# Patient Record
Sex: MALE | Race: WHITE | NOT HISPANIC OR LATINO | Employment: FULL TIME | URBAN - METROPOLITAN AREA
[De-identification: names, ages, dates, MRNs, and addresses within clinical notes are randomized per-mention and may not be internally consistent; named-entity substitution may affect disease eponyms.]

---

## 2017-04-14 ENCOUNTER — RADIANT APPOINTMENT (OUTPATIENT)
Dept: GENERAL RADIOLOGY | Facility: CLINIC | Age: 27
End: 2017-04-14
Attending: INTERNAL MEDICINE
Payer: COMMERCIAL

## 2017-04-14 ENCOUNTER — OFFICE VISIT (OUTPATIENT)
Dept: FAMILY MEDICINE | Facility: CLINIC | Age: 27
End: 2017-04-14
Payer: COMMERCIAL

## 2017-04-14 VITALS
SYSTOLIC BLOOD PRESSURE: 124 MMHG | OXYGEN SATURATION: 97 % | HEIGHT: 74 IN | BODY MASS INDEX: 30.67 KG/M2 | TEMPERATURE: 98.2 F | HEART RATE: 62 BPM | RESPIRATION RATE: 16 BRPM | WEIGHT: 239 LBS | DIASTOLIC BLOOD PRESSURE: 70 MMHG

## 2017-04-14 DIAGNOSIS — R10.31 ABDOMINAL PAIN, RIGHT LOWER QUADRANT: Primary | ICD-10-CM

## 2017-04-14 DIAGNOSIS — R10.31 ABDOMINAL PAIN, RIGHT LOWER QUADRANT: ICD-10-CM

## 2017-04-14 PROCEDURE — 74020 XR ABDOMEN 2 VW: CPT

## 2017-04-14 PROCEDURE — 99214 OFFICE O/P EST MOD 30 MIN: CPT | Performed by: INTERNAL MEDICINE

## 2017-04-14 NOTE — NURSING NOTE
"Chief Complaint   Patient presents with     Abdominal Pain     rlq pressure and pain, more near groin. Concern of Hernia       Initial /70  Pulse 62  Temp 98.2  F (36.8  C) (Oral)  Resp 16  Ht 6' 2\" (1.88 m)  Wt 239 lb (108.4 kg)  SpO2 97%  BMI 30.69 kg/m2 Estimated body mass index is 30.69 kg/(m^2) as calculated from the following:    Height as of this encounter: 6' 2\" (1.88 m).    Weight as of this encounter: 239 lb (108.4 kg).  Medication Reconciliation: complete  "

## 2017-04-14 NOTE — MR AVS SNAPSHOT
"              After Visit Summary   4/14/2017    Travis Carbone    MRN: [id number]           Patient Information     Date Of Birth          1990        Visit Information        Provider Department      4/14/2017 3:00 PM Leah Kramer MD [facility]        Today's Diagnoses     Abdominal pain, right lower quadrant    -  1      Care Instructions    No abdominal wall defect or inguinal hernia found today  Question if related to gas/bowel movements.  Try miralax 1 dose a day in water to see if improved     If symptoms persist, could see Urology for their opinion  Or recheck here in clinic    Call or return to clinic if symptoms worsen or fail to improve as anticipated.          Follow-ups after your visit        Who to contact     If you have questions or need follow up information about today's clinic visit or your schedule please contact [facility] directly at [phone].  Normal or non-critical lab and imaging results will be communicated to you by MyChart, letter or phone within 4 business days after the clinic has received the results. If you do not hear from us within 7 days, please contact the clinic through [employer] or phone. If you have a critical or abnormal lab result, we will notify you by phone as soon as possible.  Submit refill requests through [employer] or call your pharmacy and they will forward the refill request to us. Please allow 3 business days for your refill to be completed.          Additional Information About Your Visit        MyChart Information     [employer] lets you send messages to your doctor, view your test results, renew your prescriptions, schedule appointments and more. To sign up, go to [email or web address] . Click on \"Log in\" on the left side of the screen, which will take you to the Welcome page. Then click on \"Sign up Now\" on the right side of the page.     You will be asked to enter the access code listed below, as well as " "some personal information. Please follow the directions to create your username and password.     Your access code is: [email or web address]  Expires: 2017  3:56 PM     Your access code will  in 90 days. If you need help or a new code, please call your [facility] or [phone].        Care EveryWhere ID     This is your Care EveryWhere ID. This could be used by other organizations to access your [place] medical records  [id number]        Your Vitals Were     Pulse Temperature Respirations Height Pulse Oximetry BMI (Body Mass Index)    62 98.2  F (36.8  C) (Oral) 16 6' 2\" (1.88 m) 97% 30.69 kg/m2       Blood Pressure from Last 3 Encounters:   17 124/70    Weight from Last 3 Encounters:   17 239 lb (108.4 kg)               Primary Care Provider    None Specified       No primary provider on file.        Thank you!     Thank you for choosing [facility]  for your care. Our goal is always to provide you with excellent care. Hearing back from our patients is one way we can continue to improve our services. Please take a few minutes to complete the written survey that you may receive in the mail after your visit with us. Thank you!             Your Updated Medication List - Protect others around you: Learn how to safely use, store and throw away your medicines at www.disposemymeds.org.      Notice  As of 2017  3:56 PM    You have not been prescribed any medications.      "

## 2017-04-14 NOTE — PATIENT INSTRUCTIONS
No abdominal wall defect or inguinal hernia found today  Question if related to gas/bowel movements.  Try miralax 1 dose a day in water to see if improved     If symptoms persist, could see Urology for their opinion  Or recheck here in clinic    Call or return to clinic if symptoms worsen or fail to improve as anticipated.

## 2017-04-14 NOTE — PROGRESS NOTES
"SUBJECTIVE:  Travis OKSANA Carbone, a 26 year old male scheduled an appointment to discuss the following issues:  Chief Complaint   Patient presents with     Abdominal Pain     rlq pressure and pain, more near groin. Concern of Hernia     Pressure in right groin area, may shoot down near right testicle  intermittently past few months.  No lump of bump noticed  Area may be firmness or gas. As he & wife have changed diet/eating healthier.   Wonders if gas, felt better bowel movements  More noticeable past few days    4 mile run last week bothered him.    No injury recalled  In [employer]    There is no problem list on file for this patient.        No current outpatient prescriptions on file prior to visit.  No current facility-administered medications on file prior to visit.       Medical, social, surgical, and family histories reviewed and updated as of 4/14/2017.    ROS:  C: NEGATIVE for fever, chills  GI: NEGATIVE for nausea, vomiting  bowel movement are normal   normal appetite  No testicle pain      OBJECTIVE:  /70  Pulse 62  Temp 98.2  F (36.8  C) (Oral)  Resp 16  Ht 6' 2\" (1.88 m)  Wt 239 lb (108.4 kg)  SpO2 97%  BMI 30.69 kg/m2  EXAM:  GENERAL APPEARANCE: healthy, alert and no distress  ABDOMEN:  soft, ? Vague mild tenderness RLQ without guarding or rebound, no masses or abd wall defects  and bowel sounds normal    GU_male: testicles normal without atrophy or masses, no hernias  Noted in inguinal or direct hernia area    AXR to eval if firmness/discomfort related to gas/stool  Xray there is stool in area of discomfort      ASSESSMENT/PLAN:    ASSESSMENT/PLAN:      ICD-10-CM    1. Abdominal pain, right lower quadrant R10.31 XR Abdomen 2 Views       Patient Instructions   No abdominal wall defect or inguinal hernia found today  Question if related to gas/bowel movements.  Try miralax 1 dose a day in water to see if improved     If symptoms persist, could see Urology for their opinion  Or recheck here in " clinic    Call or return to clinic if symptoms worsen or fail to improve as anticipated.          Leah Kramer MD

## 2017-09-07 ENCOUNTER — OFFICE VISIT (OUTPATIENT)
Dept: FAMILY MEDICINE | Facility: CLINIC | Age: 27
End: 2017-09-07
Payer: COMMERCIAL

## 2017-09-07 VITALS
TEMPERATURE: 97.5 F | OXYGEN SATURATION: 97 % | WEIGHT: 255 LBS | DIASTOLIC BLOOD PRESSURE: 78 MMHG | SYSTOLIC BLOOD PRESSURE: 122 MMHG | BODY MASS INDEX: 32.74 KG/M2 | HEART RATE: 67 BPM

## 2017-09-07 DIAGNOSIS — Z00.00 ROUTINE GENERAL MEDICAL EXAMINATION AT A HEALTH CARE FACILITY: Primary | ICD-10-CM

## 2017-09-07 LAB
ANION GAP SERPL CALCULATED.3IONS-SCNC: 9 MMOL/L (ref 3–14)
BUN SERPL-MCNC: 19 MG/DL (ref 7–30)
CALCIUM SERPL-MCNC: 9.5 MG/DL (ref 8.5–10.1)
CHLORIDE SERPL-SCNC: 101 MMOL/L (ref 94–109)
CHOLEST SERPL-MCNC: 176 MG/DL
CO2 SERPL-SCNC: 28 MMOL/L (ref 20–32)
CREAT SERPL-MCNC: 0.93 MG/DL (ref 0.66–1.25)
GFR SERPL CREATININE-BSD FRML MDRD: >90 ML/MIN/1.7M2
GLUCOSE SERPL-MCNC: 95 MG/DL (ref 70–99)
HDLC SERPL-MCNC: 42 MG/DL
LDLC SERPL CALC-MCNC: 87 MG/DL
NONHDLC SERPL-MCNC: 134 MG/DL
POTASSIUM SERPL-SCNC: 4 MMOL/L (ref 3.4–5.3)
SODIUM SERPL-SCNC: 138 MMOL/L (ref 133–144)
TRIGL SERPL-MCNC: 236 MG/DL

## 2017-09-07 PROCEDURE — 99385 PREV VISIT NEW AGE 18-39: CPT | Performed by: NURSE PRACTITIONER

## 2017-09-07 PROCEDURE — 80048 BASIC METABOLIC PNL TOTAL CA: CPT | Performed by: NURSE PRACTITIONER

## 2017-09-07 PROCEDURE — 36415 COLL VENOUS BLD VENIPUNCTURE: CPT | Performed by: NURSE PRACTITIONER

## 2017-09-07 PROCEDURE — 80061 LIPID PANEL: CPT | Performed by: NURSE PRACTITIONER

## 2017-09-07 ASSESSMENT — PAIN SCALES - GENERAL: PAINLEVEL: NO PAIN (0)

## 2017-09-07 NOTE — LETTER
[facility]  [address] [place]  [address]  [phone]        September 11, 2017    Travis Carbone  [address] [place] [place] [address]        Dear Travis,    The results of your recent labs are enclosed.  Your cholesterol is elevated, but not high enough that you need to be treated with a medication. Reduce your consumption of cholesterol and saturated fats and eliminate trans fats from your diet. Increase your consumption of healthy fats (nuts, fish, seafood, avocado, olive oil), whole grains and fruits and vegetables. I also recommend exercising 150 minutes a week. These changes will help to improve your cholesterol. I recommend rechecking your cholesterol in 6 months to make sure it's improving.   Your other labs look good.   Please call the clinic if you have any concerns.    Results for orders placed or performed in visit on 09/07/17   Basic metabolic panel   Result Value Ref Range    Sodium 138 133 - 144 mmol/L    Potassium 4.0 3.4 - 5.3 mmol/L    Chloride 101 94 - 109 mmol/L    Carbon Dioxide 28 20 - 32 mmol/L    Anion Gap 9 3 - 14 mmol/L    Glucose 95 70 - 99 mg/dL    Urea Nitrogen 19 7 - 30 mg/dL    Creatinine 0.93 0.66 - 1.25 mg/dL    GFR Estimate >90 >60 mL/min/1.7m2    GFR Estimate If Black >90 >60 mL/min/1.7m2    Calcium 9.5 8.5 - 10.1 mg/dL   Lipid Profile (Chol, Trig, HDL, LDL calc)   Result Value Ref Range    Cholesterol 176 <200 mg/dL    Triglycerides 236 (H) <150 mg/dL    HDL Cholesterol 42 >39 mg/dL    LDL Cholesterol Calculated 87 <100 mg/dL    Non HDL Cholesterol 134 (H) <130 mg/dL   If you have any questions please call the clinic at [phone].  Sincerely,    Moriah Alfredo APRN CNP  LMD

## 2017-09-07 NOTE — PROGRESS NOTES
SUBJECTIVE:   CC: Travis Carbone is an 27 year old male who presents for preventative health visit.     Physical   Annual:     Getting at least 3 servings of Calcium per day::  Yes    Bi-annual eye exam::  Yes    Dental care twice a year::  Yes    Sleep apnea or symptoms of sleep apnea::  None    Diet::  Regular (no restrictions)    Frequency of exercise::  4-5 days/week    Duration of exercise::  30-45 minutes    Taking medications regularly::  Yes    Medication side effects::  None    Additional concerns today::  No      Patient would like to be a   Needs letter stating he's ok to participate in physical exam  Exercises regularly. Tolerates without chest pain, palpitations, excessive SOB  Nonsmoker  No asthma  No history of syncope  No previous medical history  Does not drink alcohol  No drug use  No prior history of fracture  No family history of early onset heart disease or sudden death    He denies other concerns    Today's PHQ-2 Score: PHQ-2 ( 1999 [employer]) 9/7/2017   Q1: Little interest or pleasure in doing things 0   Q2: Feeling down, depressed or hopeless 0   PHQ-2 Score 0   Q1: Little interest or pleasure in doing things Not at all   Q2: Feeling down, depressed or hopeless Not at all   PHQ-2 Score 0       Abuse: Current or Past(Physical, Sexual or Emotional)- No  Do you feel safe in your environment - Yes    Social History   Substance Use Topics     Smoking status: Never Smoker     Smokeless tobacco: Never Used     Alcohol use No     The patient does not drink >3 drinks per day nor >7 drinks per week.    Last PSA: No results found for: PSA    Reviewed orders with patient. Reviewed health maintenance and updated orders accordingly - Yes  No previous records available    Reviewed and updated as needed this visit by clinical staff  Tobacco  Allergies  Meds  Med Hx  Surg Hx  Fam Hx  Soc Hx        Reviewed and updated as needed this visit by Provider              ROS:  C: NEGATIVE  for fever, chills, change in weight  I: NEGATIVE for worrisome rashes, moles or lesions  E: NEGATIVE for vision changes or irritation  ENT: NEGATIVE for ear, mouth and throat problems  R: NEGATIVE for significant cough or SOB  CV: NEGATIVE for chest pain, palpitations or peripheral edema  GI: NEGATIVE for nausea, abdominal pain, heartburn, or change in bowel habits   male: negative for dysuria, hematuria, decreased urinary stream, erectile dysfunction, urethral discharge  M: NEGATIVE for significant arthralgias or myalgia  N: NEGATIVE for weakness, dizziness or paresthesias  P: NEGATIVE for changes in mood or affect    OBJECTIVE:   /78 (BP Location: Right arm, Patient Position: Chair, Cuff Size: Adult Large)  Pulse 67  Temp 97.5  F (36.4  C) (Oral)  Wt 255 lb (115.7 kg)  SpO2 97%  BMI 32.74 kg/m2    EXAM:  GENERAL: healthy, alert and no distress  EYES: Eyes grossly normal to inspection, PERRL and conjunctivae and sclerae normal  HENT: ear canals and TM's normal, nose and mouth without ulcers or lesions  NECK: no adenopathy, no asymmetry, masses, or scars and thyroid normal to palpation  RESP: lungs clear to auscultation - no rales, rhonchi or wheezes  CV: regular rate and rhythm, normal S1 S2, no S3 or S4, no murmur, click or rub, no peripheral edema and peripheral pulses strong  ABDOMEN: soft, nontender, no hepatosplenomegaly, no masses and bowel sounds normal  MS: no gross musculoskeletal defects noted, no edema  SKIN: no suspicious lesions or rashes  NEURO: Normal strength and tone, mentation intact and speech normal  PSYCH: mentation appears normal, affect normal/bright    ASSESSMENT/PLAN:       ICD-10-CM    1. Routine general medical examination at a health care facility Z00.00 Basic metabolic panel     Lipid Profile (Chol, Trig, HDL, LDL calc)       COUNSELING:   Reviewed preventive health counseling, as reflected in patient instructions       Regular exercise       Healthy  "diet/nutrition    Letter written       reports that he has never smoked. He has never used smokeless tobacco.      Estimated body mass index is 32.74 kg/(m^2) as calculated from the following:    Height as of 4/14/17: 6' 2\" (1.88 m).    Weight as of this encounter: 255 lb (115.7 kg).         Counseling Resources:  ATP IV Guidelines  Pooled Cohorts Equation Calculator  FRAX Risk Assessment  ICSI Preventive Guidelines  Dietary Guidelines for Americans, 2010  USDA's MyPlate  ASA Prophylaxis  Lung CA Screening    ROBIN Hines CNP  [facility] for HPI/ROS submitted by the patient on 9/7/2017   PHQ-2 Score: 0    "

## 2017-09-07 NOTE — LETTER
[address] [address]  Phone: [phone]  Fax: [phone]    September 7, 2017        Travis Carbone  [address] [place] [address]          To whom it may concern:    RE: Travis Carbone    Patient was seen for a physical exam at clinic today.  He is ok to participate in physical fitness exam and work as a .    Please contact me for questions or concerns.      Sincerely,        ROBIN Hines CNP

## 2017-09-07 NOTE — MR AVS SNAPSHOT
"              After Visit Summary   2017    Travis Carbone    MRN: [id number]           Patient Information     Date Of Birth          1990        Visit Information        Provider Department      2017 8:40 AM Moriah Alfredo APRN CNP [facility]        Today's Diagnoses     Routine general medical examination at a health care facility    -  1       Follow-ups after your visit        Who to contact     If you have questions or need follow up information about today's clinic visit or your schedule please contact [facility] directly at [phone].  Normal or non-critical lab and imaging results will be communicated to you by [employer], letter or phone within 4 business days after the clinic has received the results. If you do not hear from us within 7 days, please contact the clinic through [employer] or phone. If you have a critical or abnormal lab result, we will notify you by phone as soon as possible.  Submit refill requests through [employer] or call your pharmacy and they will forward the refill request to us. Please allow 3 business days for your refill to be completed.          Additional Information About Your Visit        MyChart Information     [employer] lets you send messages to your doctor, view your test results, renew your prescriptions, schedule appointments and more. To sign up, go to [email or web address] . Click on \"Log in\" on the left side of the screen, which will take you to the Welcome page. Then click on \"Sign up Now\" on the right side of the page.     You will be asked to enter the access code listed below, as well as some personal information. Please follow the directions to create your username and password.     Your access code is: [email or web address]  Expires: 2017  9:12 AM     Your access code will  in 90 days. If you need help or a new code, please call your [facility] or [phone].        Care EveryWhere ID     " This is your Care EveryWhere ID. This could be used by other organizations to access your [place] medical records  [id number]        Your Vitals Were     Pulse Temperature Pulse Oximetry BMI (Body Mass Index)          67 97.5  F (36.4  C) (Oral) 97% 32.74 kg/m2         Blood Pressure from Last 3 Encounters:   09/07/17 122/78   04/14/17 124/70    Weight from Last 3 Encounters:   09/07/17 255 lb (115.7 kg)   04/14/17 239 lb (108.4 kg)              We Performed the Following     Basic metabolic panel     Lipid Profile (Chol, Trig, HDL, LDL calc)        Primary Care Provider    None Specified       No primary provider on file.        Equal Access to Services     DAE BAXTER : David Fang, ruthie vargas, tianna wasserman, eric ramos . So [facility] [phone].    ATENCIÓN: Si habla español, tiene a sullivan disposición servicios gratuitos de asistencia lingüística. Llame al [phone].    We comply with applicable federal civil rights laws and [place] laws. We do not discriminate on the basis of race, color, national origin, age, disability sex, sexual orientation or gender identity.            Thank you!     Thank you for choosing [facility]  for your care. Our goal is always to provide you with excellent care. Hearing back from our patients is one way we can continue to improve our services. Please take a few minutes to complete the written survey that you may receive in the mail after your visit with us. Thank you!             Your Updated Medication List - Protect others around you: Learn how to safely use, store and throw away your medicines at www.disposemymeds.org.      Notice  As of 9/7/2017  9:12 AM    You have not been prescribed any medications.

## 2017-09-11 DIAGNOSIS — E78.5 HYPERLIPIDEMIA LDL GOAL <130: Primary | ICD-10-CM

## 2017-09-11 NOTE — PROGRESS NOTES
[address] [address]  Phone: [phone]  Fax: [phone]      09/11/17    Travis Carbone  [address] [place] [address]      Dear Travis,    The results of your recent labs are enclosed.  Your cholesterol is elevated, but not high enough that you need to be treated with a medication. Reduce your consumption of cholesterol and saturated fats and eliminate trans fats from your diet. Increase your consumption of healthy fats (nuts, fish, seafood, avocado, olive oil), whole grains and fruits and vegetables. I also recommend exercising 150 minutes a week. These changes will help to improve your cholesterol. I recommend rechecking your cholesterol in 6 months to make sure it's improving.   Your other labs look good.       Please call the clinic if you have any concerns.    Sincerely,    ROBIN Hines CNP    Your [facility] Care Team

## 2017-12-16 ENCOUNTER — OFFICE VISIT (OUTPATIENT)
Dept: URGENT CARE | Facility: URGENT CARE | Age: 27
End: 2017-12-16
Payer: COMMERCIAL

## 2017-12-16 VITALS
OXYGEN SATURATION: 97 % | HEART RATE: 77 BPM | DIASTOLIC BLOOD PRESSURE: 80 MMHG | BODY MASS INDEX: 33.18 KG/M2 | WEIGHT: 258.4 LBS | TEMPERATURE: 98.9 F | SYSTOLIC BLOOD PRESSURE: 154 MMHG | RESPIRATION RATE: 16 BRPM

## 2017-12-16 DIAGNOSIS — B34.9 VIRAL SYNDROME: Primary | ICD-10-CM

## 2017-12-16 DIAGNOSIS — R07.0 THROAT PAIN: ICD-10-CM

## 2017-12-16 DIAGNOSIS — R09.82 POST-NASAL DRIP: ICD-10-CM

## 2017-12-16 LAB
DEPRECATED S PYO AG THROAT QL EIA: NORMAL
SPECIMEN SOURCE: NORMAL

## 2017-12-16 PROCEDURE — 87880 STREP A ASSAY W/OPTIC: CPT | Performed by: NURSE PRACTITIONER

## 2017-12-16 PROCEDURE — 99213 OFFICE O/P EST LOW 20 MIN: CPT | Performed by: NURSE PRACTITIONER

## 2017-12-16 PROCEDURE — 87081 CULTURE SCREEN ONLY: CPT | Performed by: NURSE PRACTITIONER

## 2017-12-16 NOTE — MR AVS SNAPSHOT
"              After Visit Summary   12/16/2017    Travis Carbone    MRN: [id number]           Patient Information     Date Of Birth          1990        Visit Information        Provider Department      12/16/2017 10:45 AM Phylicia Nair APRN [facility]        Today's Diagnoses     Viral syndrome    -  1    Throat pain        Post-nasal drip          Care Instructions    Increase rest and fluids. Tylenol and/or Ibuprofen for comfort. Cool mist vaporizer. If your symptoms worsen or do not resolve follow up with your primary care provider in 1 week and sooner if needed.      Strep culture is pending will result in 24-48 hours.  If it is positive and change in treatment plan will contact you.    Mucinex 600 mg 12 hour formula for ear, head and chest congestion.  It can also thin post nasal drip which can cause a cough and sore throat.      Indications for emergent return to emergency department discussed with patient, who verbalized good understanding and agreement.  Patient understands the limitations of today's evaluation.           Viral Syndrome (Adult)  A viral illness may cause a number of symptoms. The symptoms depend on the part of the body that the virus affects. If it settles in your nose, throat, and lungs, it may cause cough, sore throat, congestion, and sometimes headache. If it settles in your stomach and intestinal tract, it may cause vomiting and diarrhea. Sometimes it causes vague symptoms like \"aching all over,\" feeling tired, loss of appetite, or fever.  A viral illness usually lasts 1 to 2 weeks, but sometimes it lasts longer. In some cases, a more serious infection can look like a viral syndrome in the first few days of the illness. You may need another exam and additional tests to know the difference. Watch for the warning signs listed below.  Home care  Follow these guidelines for taking care of yourself at home:    If symptoms are severe, rest " at home for the first 2 to 3 days.    Stay away from cigarette smoke - both your smoke and the smoke from others.    You may use over-the-counter acetaminophen or ibuprofen for fever, muscle aching, and headache, unless another medicine was prescribed for this. If you have chronic liver or kidney disease or ever had a stomach ulcer or GI bleeding, talk with your doctor before using these medicines. No one who is younger than 18 and ill with a fever should take aspirin. It may cause severe disease or death.    Your appetite may be poor, so a light diet is fine. Avoid dehydration by drinking 8 to 12 8-ounce glasses of fluids each day. This may include water; orange juice; lemonade; apple, grape, and cranberry juice; clear fruit drinks; electrolyte replacement and sports drinks; and decaffeinated teas and coffee. If you have been diagnosed with a kidney disease, ask your doctor how much and what types of fluids you should drink to prevent dehydration. If you have kidney disease, drinking too much fluid can cause it build up in the your body and be dangerous to your health.    Over-the-counter remedies won't shorten the length of the illness but may be helpful for cough, sore throat; and nasal and sinus congestion. Don't use decongestants if you have high blood pressure.  Follow-up care  Follow up with your healthcare provider if you do not improve over the next week.  Call 911  Get emergency medical care if any of the following occur:    Convulsion    Feeling weak, dizzy, or like you are going to faint    Chest pain, shortness of breath, wheezing, or difficulty breathing  When to seek medical advice  Call your healthcare provider right away if any of these occur:    Cough with lots of colored sputum (mucus) or blood in your sputum    Chest pain, shortness of breath, wheezing, or difficulty breathing    Severe headache; face, neck, or ear pain    Severe, constant pain in the lower right side of your belly  (abdominal)    Continued vomiting (can t keep liquids down)    Frequent diarrhea (more than 5 times a day); blood (red or black color) or mucus in diarrhea    Feeling weak, dizzy, or like you are going to faint    Extreme thirst    Fever of 100.4 F (38 C) or higher, or as directed by your healthcare provider  Date Last Reviewed: 9/25/2015 2000-2017 [employer]. [address] [place] [address], [place] [address]. All rights reserved. This information is not intended as a substitute for professional medical care. Always follow your healthcare professional's instructions.        Self-Care for Sore Throats    Sore throats happen for many reasons, such as colds, allergies, and infections caused by viruses or bacteria. In any case, your throat becomes red and sore. Your goal for self-care is to reduce your discomfort while giving your throat a chance to heal.  Moisten and soothe your throat  Tips include the following:    Try a sip of water first thing after waking up.    Keep your throat moist by drinking 6 or more glasses of clear liquids every day.    Run a cool-air humidifier in your room overnight.    Avoid cigarette smoke.     Suck on throat lozenges, cough drops, hard candy, ice chips, or frozen fruit-juice bars. Use the sugar-free versions if your diet or medical condition requires them.  Gargle to ease irritation  Gargling every hour or 2 can ease irritation. Try gargling with 1 of these solutions:    1/4 teaspoon of salt in 1/2 cup of warm water    An over-the-counter anesthetic gargle  Use medicine for more relief  Over-the-counter medicine can reduce sore throat symptoms. Ask your pharmacist if you have questions about which medicine to use:    Ease pain with anesthetic sprays. Aspirin or an aspirin substitute also helps. Remember, never give aspirin to anyone 18 or younger, or if you are already taking blood thinners.     For sore throats caused by allergies, try antihistamines to block the allergic  reaction.    Remember: unless a sore throat is caused by a bacterial infection, antibiotics won t help you.  Prevent future sore throats  Prevention tips include the following:    Stop smoking or reduce contact with secondhand smoke. Smoke irritates the tender throat lining.    Limit contact with pets and with allergy-causing substances, such as pollen and mold.    When you re around someone with a sore throat or cold, wash your hands often to keep viruses or bacteria from spreading.    Don t strain your vocal cords.  Call your healthcare provider  Contact your healthcare provider if you have:    A temperature over 101 F (38.3 C)    White spots on the throat    Great difficulty swallowing    Trouble breathing    A skin rash    Recent exposure to someone else with strep bacteria    Severe hoarseness and swollen glands in the neck or jaw   Date Last Reviewed: 8/1/2016 2000-2017 [employer]. [address] [place] [address]. All rights reserved. This information is not intended as a substitute for professional medical care. Always follow your healthcare professional's instructions.                Follow-ups after your visit        Follow-up notes from your care team     See patient instructions section of the AVS Return if symptoms worsen or fail to improve, for Follow up with your primary care provider.      Who to contact     If you have questions or need follow up information about today's clinic visit or your schedule please contact [facility] directly at [phone].  Normal or non-critical lab and imaging results will be communicated to you by MyChart, letter or phone within 4 business days after the clinic has received the results. If you do not hear from us within 7 days, please contact the clinic through MyChart or phone. If you have a critical or abnormal lab result, we will notify you by phone as soon as possible.  Submit refill requests through  "MyChart or call your pharmacy and they will forward the refill request to us. Please allow 3 business days for your refill to be completed.          Additional Information About Your Visit        MyChart Information     [employer] lets you send messages to your doctor, view your test results, renew your prescriptions, schedule appointments and more. To sign up, go to [email or web address] . Click on \"Log in\" on the left side of the screen, which will take you to the Welcome page. Then click on \"Sign up Now\" on the right side of the page.     You will be asked to enter the access code listed below, as well as some personal information. Please follow the directions to create your username and password.     Your access code is: [email or web address]  Expires: 3/16/2018 11:27 AM     Your access code will  in 90 days. If you need help or a new code, please call your [facility] clinic or [phone].        Care EveryWhere ID     This is your Care EveryWhere ID. This could be used by other organizations to access your [facility] medical records  [id number]        Your Vitals Were     Pulse Temperature Respirations Pulse Oximetry BMI (Body Mass Index)       77 98.9  F (37.2  C) (Tympanic) 16 97% 33.18 kg/m2        Blood Pressure from Last 3 Encounters:   17 154/80   17 122/78   17 124/70    Weight from Last 3 Encounters:   17 258 lb 6.4 oz (117.2 kg)   17 255 lb (115.7 kg)   17 239 lb (108.4 kg)              We Performed the Following     Beta strep group A culture     Strep, Rapid Screen        Primary Care Provider Fax #    Physician No Ref-Primary [phone]       No address on file        Equal Access to Services     DAE BAXTER : David Fang, ruthie vargas, tianna wasserman, eric keenan. So [facility] [phone].    ATENCIÓN: Si habla español, tiene a sullivan disposición servicios gratuitos de asistencia lingüística. Llame al " [phone].    We comply with applicable federal civil rights laws and [place] laws. We do not discriminate on the basis of race, color, national origin, age, disability, sex, sexual orientation, or gender identity.            Thank you!     Thank you for choosing [facility]  for your care. Our goal is always to provide you with excellent care. Hearing back from our patients is one way we can continue to improve our services. Please take a few minutes to complete the written survey that you may receive in the mail after your visit with us. Thank you!             Your Updated Medication List - Protect others around you: Learn how to safely use, store and throw away your medicines at www.disposemymeds.org.      Notice  As of 12/16/2017 11:27 AM    You have not been prescribed any medications.

## 2017-12-16 NOTE — PATIENT INSTRUCTIONS
"Increase rest and fluids. Tylenol and/or Ibuprofen for comfort. Cool mist vaporizer. If your symptoms worsen or do not resolve follow up with your primary care provider in 1 week and sooner if needed.      Strep culture is pending will result in 24-48 hours.  If it is positive and change in treatment plan will contact you.    Mucinex 600 mg 12 hour formula for ear, head and chest congestion.  It can also thin post nasal drip which can cause a cough and sore throat.      Indications for emergent return to emergency department discussed with patient, who verbalized good understanding and agreement.  Patient understands the limitations of today's evaluation.           Viral Syndrome (Adult)  A viral illness may cause a number of symptoms. The symptoms depend on the part of the body that the virus affects. If it settles in your nose, throat, and lungs, it may cause cough, sore throat, congestion, and sometimes headache. If it settles in your stomach and intestinal tract, it may cause vomiting and diarrhea. Sometimes it causes vague symptoms like \"aching all over,\" feeling tired, loss of appetite, or fever.  A viral illness usually lasts 1 to 2 weeks, but sometimes it lasts longer. In some cases, a more serious infection can look like a viral syndrome in the first few days of the illness. You may need another exam and additional tests to know the difference. Watch for the warning signs listed below.  Home care  Follow these guidelines for taking care of yourself at home:    If symptoms are severe, rest at home for the first 2 to 3 days.    Stay away from cigarette smoke - both your smoke and the smoke from others.    You may use over-the-counter acetaminophen or ibuprofen for fever, muscle aching, and headache, unless another medicine was prescribed for this. If you have chronic liver or kidney disease or ever had a stomach ulcer or GI bleeding, talk with your doctor before using these medicines. No one who is younger " than 18 and ill with a fever should take aspirin. It may cause severe disease or death.    Your appetite may be poor, so a light diet is fine. Avoid dehydration by drinking 8 to 12 8-ounce glasses of fluids each day. This may include water; orange juice; lemonade; apple, grape, and cranberry juice; clear fruit drinks; electrolyte replacement and sports drinks; and decaffeinated teas and coffee. If you have been diagnosed with a kidney disease, ask your doctor how much and what types of fluids you should drink to prevent dehydration. If you have kidney disease, drinking too much fluid can cause it build up in the your body and be dangerous to your health.    Over-the-counter remedies won't shorten the length of the illness but may be helpful for cough, sore throat; and nasal and sinus congestion. Don't use decongestants if you have high blood pressure.  Follow-up care  Follow up with your healthcare provider if you do not improve over the next week.  Call 911  Get emergency medical care if any of the following occur:    Convulsion    Feeling weak, dizzy, or like you are going to faint    Chest pain, shortness of breath, wheezing, or difficulty breathing  When to seek medical advice  Call your healthcare provider right away if any of these occur:    Cough with lots of colored sputum (mucus) or blood in your sputum    Chest pain, shortness of breath, wheezing, or difficulty breathing    Severe headache; face, neck, or ear pain    Severe, constant pain in the lower right side of your belly (abdominal)    Continued vomiting (can t keep liquids down)    Frequent diarrhea (more than 5 times a day); blood (red or black color) or mucus in diarrhea    Feeling weak, dizzy, or like you are going to faint    Extreme thirst    Fever of 100.4 F (38 C) or higher, or as directed by your healthcare provider  Date Last Reviewed: 9/25/2015 2000-2017 [employer]. [address] [place] [address], [place] [address]. All rights  reserved. This information is not intended as a substitute for professional medical care. Always follow your healthcare professional's instructions.        Self-Care for Sore Throats    Sore throats happen for many reasons, such as colds, allergies, and infections caused by viruses or bacteria. In any case, your throat becomes red and sore. Your goal for self-care is to reduce your discomfort while giving your throat a chance to heal.  Moisten and soothe your throat  Tips include the following:    Try a sip of water first thing after waking up.    Keep your throat moist by drinking 6 or more glasses of clear liquids every day.    Run a cool-air humidifier in your room overnight.    Avoid cigarette smoke.     Suck on throat lozenges, cough drops, hard candy, ice chips, or frozen fruit-juice bars. Use the sugar-free versions if your diet or medical condition requires them.  Gargle to ease irritation  Gargling every hour or 2 can ease irritation. Try gargling with 1 of these solutions:    1/4 teaspoon of salt in 1/2 cup of warm water    An over-the-counter anesthetic gargle  Use medicine for more relief  Over-the-counter medicine can reduce sore throat symptoms. Ask your pharmacist if you have questions about which medicine to use:    Ease pain with anesthetic sprays. Aspirin or an aspirin substitute also helps. Remember, never give aspirin to anyone 18 or younger, or if you are already taking blood thinners.     For sore throats caused by allergies, try antihistamines to block the allergic reaction.    Remember: unless a sore throat is caused by a bacterial infection, antibiotics won t help you.  Prevent future sore throats  Prevention tips include the following:    Stop smoking or reduce contact with secondhand smoke. Smoke irritates the tender throat lining.    Limit contact with pets and with allergy-causing substances, such as pollen and mold.    When you re around someone with a sore throat or cold, wash your  hands often to keep viruses or bacteria from spreading.    Don t strain your vocal cords.  Call your healthcare provider  Contact your healthcare provider if you have:    A temperature over 101 F (38.3 C)    White spots on the throat    Great difficulty swallowing    Trouble breathing    A skin rash    Recent exposure to someone else with strep bacteria    Severe hoarseness and swollen glands in the neck or jaw   Date Last Reviewed: 8/1/2016 2000-2017 [employer]. [address] [place] [address], [place] [address]. All rights reserved. This information is not intended as a substitute for professional medical care. Always follow your healthcare professional's instructions.

## 2017-12-16 NOTE — PROGRESS NOTES
SUBJECTIVE:   Travis Carbone is a 27 year old male who presents to clinic today for the following health issues:      Chief Complaint   Patient presents with     Pharyngitis     started tuesday night, fever- kicked it that night, nasal drainage, no congestion, whole neck aches- everywhere around it, throat pain is not unbearable, little bit of ear pain (both), no cough           Problem list and histories reviewed & adjusted, as indicated.  Additional history: as documented    There is no problem list on file for this patient.    History reviewed. No pertinent surgical history.    Social History   Substance Use Topics     Smoking status: Never Smoker     Smokeless tobacco: Never Used     Alcohol use No     History reviewed. No pertinent family history.      No current outpatient prescriptions on file.     Allergies   Allergen Reactions     Sulfa Drugs Other (See Comments)     Redness in eyes from eye sulfa medicine     Labs reviewed in EPIC      Reviewed and updated as needed this visit by clinical staffTobacco  Allergies  Meds  Problems  Med Hx  Surg Hx  Fam Hx  Soc Hx        Reviewed and updated as needed this visit by Provider  Allergies  Meds  Problems         ROS:  Constitutional, HEENT, cardiovascular, pulmonary, GI, , musculoskeletal, neuro, skin, endocrine and psych systems are negative, except as otherwise noted.      OBJECTIVE:   /80  Pulse 77  Temp 98.9  F (37.2  C) (Tympanic)  Resp 16  Wt 258 lb 6.4 oz (117.2 kg)  SpO2 97%  BMI 33.18 kg/m2  Body mass index is 33.18 kg/(m^2).   GENERAL: healthy, alert and no distress, nontoxic in appearance  EYES: Eyes grossly normal to inspection, PERRL and conjunctivae and sclerae normal  HENT: ear canals and TM's normal, nose and mouth without ulcers or lesions  NECK: no adenopathy, supple with full ROM  RESP: lungs clear to auscultation - no rales, rhonchi or wheezes  CV: regular rate and rhythm, normal S1 S2, no S3 or S4, no murmur, click  "or rub, no peripheral edema   ABDOMEN: soft, nontender, no hepatosplenomegaly, no masses   MS: no gross musculoskeletal defects noted, no edema  No rash    Diagnostic Test Results:  Results for orders placed or performed in visit on 12/16/17 (from the past 24 hour(s))   Strep, Rapid Screen   Result Value Ref Range    Specimen Description Throat     Rapid Strep A Screen       NEGATIVE: No Group A streptococcal antigen detected by immunoassay, await culture report.       ASSESSMENT/PLAN:     Problem List Items Addressed This Visit     None      Visit Diagnoses     Viral syndrome    -  Primary    Throat pain        Relevant Orders    Strep, Rapid Screen (Completed)    Beta strep group A culture (Completed)    Post-nasal drip                   Patient Instructions     Increase rest and fluids. Tylenol and/or Ibuprofen for comfort. Cool mist vaporizer. If your symptoms worsen or do not resolve follow up with your primary care provider in 1 week and sooner if needed.      Strep culture is pending will result in 24-48 hours.  If it is positive and change in treatment plan will contact you.    Mucinex 600 mg 12 hour formula for ear, head and chest congestion.  It can also thin post nasal drip which can cause a cough and sore throat.      Indications for emergent return to emergency department discussed with patient, who verbalized good understanding and agreement.  Patient understands the limitations of today's evaluation.           Viral Syndrome (Adult)  A viral illness may cause a number of symptoms. The symptoms depend on the part of the body that the virus affects. If it settles in your nose, throat, and lungs, it may cause cough, sore throat, congestion, and sometimes headache. If it settles in your stomach and intestinal tract, it may cause vomiting and diarrhea. Sometimes it causes vague symptoms like \"aching all over,\" feeling tired, loss of appetite, or fever.  A viral illness usually lasts 1 to 2 weeks, but " sometimes it lasts longer. In some cases, a more serious infection can look like a viral syndrome in the first few days of the illness. You may need another exam and additional tests to know the difference. Watch for the warning signs listed below.  Home care  Follow these guidelines for taking care of yourself at home:    If symptoms are severe, rest at home for the first 2 to 3 days.    Stay away from cigarette smoke - both your smoke and the smoke from others.    You may use over-the-counter acetaminophen or ibuprofen for fever, muscle aching, and headache, unless another medicine was prescribed for this. If you have chronic liver or kidney disease or ever had a stomach ulcer or GI bleeding, talk with your doctor before using these medicines. No one who is younger than 18 and ill with a fever should take aspirin. It may cause severe disease or death.    Your appetite may be poor, so a light diet is fine. Avoid dehydration by drinking 8 to 12 8-ounce glasses of fluids each day. This may include water; orange juice; lemonade; apple, grape, and cranberry juice; clear fruit drinks; electrolyte replacement and sports drinks; and decaffeinated teas and coffee. If you have been diagnosed with a kidney disease, ask your doctor how much and what types of fluids you should drink to prevent dehydration. If you have kidney disease, drinking too much fluid can cause it build up in the your body and be dangerous to your health.    Over-the-counter remedies won't shorten the length of the illness but may be helpful for cough, sore throat; and nasal and sinus congestion. Don't use decongestants if you have high blood pressure.  Follow-up care  Follow up with your healthcare provider if you do not improve over the next week.  Call 911  Get emergency medical care if any of the following occur:    Convulsion    Feeling weak, dizzy, or like you are going to faint    Chest pain, shortness of breath, wheezing, or difficulty  breathing  When to seek medical advice  Call your healthcare provider right away if any of these occur:    Cough with lots of colored sputum (mucus) or blood in your sputum    Chest pain, shortness of breath, wheezing, or difficulty breathing    Severe headache; face, neck, or ear pain    Severe, constant pain in the lower right side of your belly (abdominal)    Continued vomiting (can t keep liquids down)    Frequent diarrhea (more than 5 times a day); blood (red or black color) or mucus in diarrhea    Feeling weak, dizzy, or like you are going to faint    Extreme thirst    Fever of 100.4 F (38 C) or higher, or as directed by your healthcare provider  Date Last Reviewed: 9/25/2015 2000-2017 [employer]. [address] [place] [address], [place] [address]. All rights reserved. This information is not intended as a substitute for professional medical care. Always follow your healthcare professional's instructions.        Self-Care for Sore Throats    Sore throats happen for many reasons, such as colds, allergies, and infections caused by viruses or bacteria. In any case, your throat becomes red and sore. Your goal for self-care is to reduce your discomfort while giving your throat a chance to heal.  Moisten and soothe your throat  Tips include the following:    Try a sip of water first thing after waking up.    Keep your throat moist by drinking 6 or more glasses of clear liquids every day.    Run a cool-air humidifier in your room overnight.    Avoid cigarette smoke.     Suck on throat lozenges, cough drops, hard candy, ice chips, or frozen fruit-juice bars. Use the sugar-free versions if your diet or medical condition requires them.  Gargle to ease irritation  Gargling every hour or 2 can ease irritation. Try gargling with 1 of these solutions:    1/4 teaspoon of salt in 1/2 cup of warm water    An over-the-counter anesthetic gargle  Use medicine for more relief  Over-the-counter medicine can reduce sore  throat symptoms. Ask your pharmacist if you have questions about which medicine to use:    Ease pain with anesthetic sprays. Aspirin or an aspirin substitute also helps. Remember, never give aspirin to anyone 18 or younger, or if you are already taking blood thinners.     For sore throats caused by allergies, try antihistamines to block the allergic reaction.    Remember: unless a sore throat is caused by a bacterial infection, antibiotics won t help you.  Prevent future sore throats  Prevention tips include the following:    Stop smoking or reduce contact with secondhand smoke. Smoke irritates the tender throat lining.    Limit contact with pets and with allergy-causing substances, such as pollen and mold.    When you re around someone with a sore throat or cold, wash your hands often to keep viruses or bacteria from spreading.    Don t strain your vocal cords.  Call your healthcare provider  Contact your healthcare provider if you have:    A temperature over 101 F (38.3 C)    White spots on the throat    Great difficulty swallowing    Trouble breathing    A skin rash    Recent exposure to someone else with strep bacteria    Severe hoarseness and swollen glands in the neck or jaw   Date Last Reviewed: 8/1/2016 2000-2017 [employer]. [address] [place] [address]. All rights reserved. This information is not intended as a substitute for professional medical care. Always follow your healthcare professional's instructions.            ROBIN Evans [facility]

## 2017-12-17 LAB
BACTERIA SPEC CULT: NORMAL
SPECIMEN SOURCE: NORMAL

## 2018-02-25 ENCOUNTER — OFFICE VISIT (OUTPATIENT)
Dept: URGENT CARE | Facility: URGENT CARE | Age: 28
End: 2018-02-25
Payer: COMMERCIAL

## 2018-02-25 VITALS
HEART RATE: 80 BPM | RESPIRATION RATE: 16 BRPM | TEMPERATURE: 97.9 F | DIASTOLIC BLOOD PRESSURE: 70 MMHG | SYSTOLIC BLOOD PRESSURE: 128 MMHG | BODY MASS INDEX: 30.09 KG/M2 | WEIGHT: 242 LBS | HEIGHT: 75 IN | OXYGEN SATURATION: 99 %

## 2018-02-25 DIAGNOSIS — F33.9 RECURRENT MAJOR DEPRESSIVE DISORDER, REMISSION STATUS UNSPECIFIED (H): ICD-10-CM

## 2018-02-25 DIAGNOSIS — F41.0 PANIC ATTACK: Primary | ICD-10-CM

## 2018-02-25 PROCEDURE — 99214 OFFICE O/P EST MOD 30 MIN: CPT | Performed by: PHYSICIAN ASSISTANT

## 2018-02-25 RX ORDER — HYDROXYZINE HYDROCHLORIDE 25 MG/1
25 TABLET, FILM COATED ORAL EVERY 8 HOURS PRN
Qty: 30 TABLET | Refills: 1 | Status: SHIPPED | OUTPATIENT
Start: 2018-02-25 | End: 2018-07-09

## 2018-02-25 ASSESSMENT — ANXIETY QUESTIONNAIRES
GAD7 TOTAL SCORE: 18
3. WORRYING TOO MUCH ABOUT DIFFERENT THINGS: NEARLY EVERY DAY
IF YOU CHECKED OFF ANY PROBLEMS ON THIS QUESTIONNAIRE, HOW DIFFICULT HAVE THESE PROBLEMS MADE IT FOR YOU TO DO YOUR WORK, TAKE CARE OF THINGS AT HOME, OR GET ALONG WITH OTHER PEOPLE: VERY DIFFICULT
5. BEING SO RESTLESS THAT IT IS HARD TO SIT STILL: NEARLY EVERY DAY
2. NOT BEING ABLE TO STOP OR CONTROL WORRYING: NEARLY EVERY DAY
1. FEELING NERVOUS, ANXIOUS, OR ON EDGE: NEARLY EVERY DAY
6. BECOMING EASILY ANNOYED OR IRRITABLE: NOT AT ALL
7. FEELING AFRAID AS IF SOMETHING AWFUL MIGHT HAPPEN: NEARLY EVERY DAY

## 2018-02-25 ASSESSMENT — PATIENT HEALTH QUESTIONNAIRE - PHQ9: 5. POOR APPETITE OR OVEREATING: NEARLY EVERY DAY

## 2018-02-25 NOTE — LETTER
[facility]  [address] [address]  Phone: [phone]    February 25, 2018        Travis OKSANA Caydenkar  [address] [place] [address]          To whom it may concern:    RE: Travis GANDHI Evelynevincekar    Patient was seen and treated today at our clinic and missed work.    Please contact me for questions or concerns.      Sincerely,        Cisco Cervantes PA-C

## 2018-02-25 NOTE — PROGRESS NOTES
"SUBJECTIVE:  Mr. Carbone is a 27 year old male who presents today describing symptoms compatible with panic anxiety. He confirms experiencing the following symptoms:Excessive worry, Insomnia, disturbed sleep, Difficulty concentrating and Panic attacks.These symptoms are interfering with his ability to function. Problems like this have been present in the past since he was very young. However, really bad over the past month with increased stress at [employer] job. He is trying to get out of the job with multiple interviewing. He works overnights and has had to do mandatory overtime which takes time from sleep increasing anxiety. Mom says he has some OCD tendencies and he says that the worst trigger of anxiety attacks is \"intrusive thoughts\" concerning suicide that he does not want to have happen. He has never been on medication and has never seen a psychiatrist. He has seen a therapist especially 5 years ago which did help .     There is no problem list on file for this patient.    No family history on file.    ROS:  CONSTITUTIONAL:NEGATIVE for fever, chills, change in weight  PSYCHIATRIC: anxiety and obsessive thoughts        Medications:  Current Outpatient Prescriptions   Medication Sig     hydrOXYzine (ATARAX) 25 MG tablet Take 1 tablet (25 mg) by mouth every 8 hours as needed for anxiety     No current facility-administered medications for this visit.        Allergies:  Sulfa drugs     SOCIAL HISTORY:  Social History     Social History     Marital status:      Spouse name: N/A     Number of children: N/A     Years of education: N/A     Occupational History     Not on file.     Social History Main Topics     Smoking status: Never Smoker     Smokeless tobacco: Never Used     Alcohol use No     Drug use: No     Sexual activity: Yes     Partners: Female     Birth control/ protection: None     Other Topics Concern     Not on file     Social History Narrative          Physical Exam   OBJECTIVE:  vital " "signs:Blood pressure 128/70, pulse 80, temperature 97.9  F (36.6  C), temperature source Oral, resp. rate 16, height 6' 2.75\" (1.899 m), weight 242 lb (109.8 kg), SpO2 99 %.   General appearence:Well nourished well developed male appearing without apparent distress  Psych: Exhibits normal mood, normal affect, normal judgment, normal insight, and normal orientation during causal conversation.  Head:Head is normal cephalic and without gross abnormality.  eyes:Eye exam reveals conjunctivae/corneas clear. PERRL, EOM's intact. Fundi benign  ENT: Normal external ears and nose . Normal canals and TM's. Normal lips teeth and gums  Resp:clear to auscultation and palpation with relaxed effort  Cardiovascular:NORMAL - regular rate and rhythm without murmur.      ASSESSMENT:   (F41.0) Panic attack  (primary encounter diagnosis)    Plan: he will call referral for appointment tomorrow     hydrOXYzine (ATARAX) 25 MG tablet, MENTAL         HEALTH REFERRAL  - Adult; Psychiatry and         Medication Management; Psychiatry; [facility]:         [facility] -         [place], [place] [phone]          Medication management & future refills will be         returned to [facility] PCP upon compl...       (F33.9) Recurrent major depressive disorder, remission status unspecified (H)      Plan:  MENTAL HEALTH REFERRAL  - Adult; Psychiatry and        Medication Management; Psychiatry; [facility]:         [facility] -         [place], [place] [phone]          Medication management & future refills will be         returned to [facility] PCP upon compl...                    "

## 2018-02-25 NOTE — MR AVS SNAPSHOT
After Visit Summary   2/25/2018    Travis Carbone    MRN: [id number]           Patient Information     Date Of Birth          1990        Visit Information        Provider Department      2/25/2018 11:40 AM Cisco Cervantes PA-C [facility]        Today's Diagnoses     Panic attack    -  1    Recurrent major depressive disorder, remission status unspecified (H)           Follow-ups after your visit        Additional Services     MENTAL HEALTH REFERRAL  - Adult; Psychiatry and Medication Management; Psychiatry; [facility]: [facility]   [place], [place] [phone]  Medication management & future refills will be returned to [facility] PCP upon compl...       All scheduling is subject to the client's specific insurance plan & benefits, provider/location availability, and provider clinical specialities.  Please arrive 15 minutes early for your first appointment and bring your completed paperwork.    Please be aware that coverage of these services is subject to the terms and limitations of your health insurance plan.  Call member services at your health plan with any benefit or coverage questions.                            Who to contact     If you have questions or need follow up information about today's clinic visit or your schedule please contact [facility] directly at [phone].  Normal or non-critical lab and imaging results will be communicated to you by MyChart, letter or phone within 4 business days after the clinic has received the results. If you do not hear from us within 7 days, please contact the clinic through MyChart or phone. If you have a critical or abnormal lab result, we will notify you by phone as soon as possible.  Submit refill requests through [employer] or call your pharmacy and they will forward the refill request to us. Please allow 3 business days for your refill to be completed.          Additional  "Information About Your Visit        MyChart Information     [employer] lets you send messages to your doctor, view your test results, renew your prescriptions, schedule appointments and more. To sign up, go to [email or web address] . Click on \"Log in\" on the left side of the screen, which will take you to the Welcome page. Then click on \"Sign up Now\" on the right side of the page.     You will be asked to enter the access code listed below, as well as some personal information. Please follow the directions to create your username and password.     Your access code is: [email or web address]  Expires: 3/16/2018 11:27 AM     Your access code will  in 90 days. If you need help or a new code, please call your [facility] clinic or [phone].        Care EveryWhere ID     This is your Care EveryWhere ID. This could be used by other organizations to access your [facility] medical records  [id number]        Your Vitals Were     Pulse Temperature Respirations Height Pulse Oximetry BMI (Body Mass Index)    80 97.9  F (36.6  C) (Oral) 16 6' 2.75\" (1.899 m) 99% 30.45 kg/m2       Blood Pressure from Last 3 Encounters:   18 128/70   17 154/80   17 122/78    Weight from Last 3 Encounters:   18 242 lb (109.8 kg)   17 258 lb 6.4 oz (117.2 kg)   17 255 lb (115.7 kg)              We Performed the Following     MENTAL HEALTH REFERRAL  - Adult; Psychiatry and Medication Management; Psychiatry; [facility]: [facility] Psychiatry Service   [place], [place] [phone]  Medication management & future refills will be returned to [facility] PCP upon compl...          Today's Medication Changes          These changes are accurate as of 18  1:00 PM.  If you have any questions, ask your nurse or doctor.               Start taking these medicines.        Dose/Directions    hydrOXYzine 25 MG tablet   Commonly known as:  ATARAX   Used for:  Panic attack   Started by:  Cisco Cervantes, ANTELMO        Dose:  25 " mg   Take 1 tablet (25 mg) by mouth every 8 hours as needed for anxiety   Quantity:  30 tablet   Refills:  1            Where to get your medicines      These medications were sent to [facility]/pharmacy #[id number] - [place], [place] - [address]  [address], [place] [address]     Phone:  [phone]     hydrOXYzine 25 MG tablet                Primary Care Provider Fax #    Physician No Ref-Primary [phone]       No address on file        Equal Access to Services     [facility] VEE : Hadii aad ku hadasho Soomaali, waaxda luqadaha, qaybta kaalmada adeegyada, waxay idiin hayaan adeeg kharabandar laeduardo . So [facility] [phone].    ATENCIÓN: Si habla español, tiene a sullivan disposición servicios gratuitos de asistencia lingüística. [facility] [phone].    We comply with applicable federal civil rights laws and [place] laws. We do not discriminate on the basis of race, color, national origin, age, disability, sex, sexual orientation, or gender identity.            Thank you!     Thank you for choosing [facility]  for your care. Our goal is always to provide you with excellent care. Hearing back from our patients is one way we can continue to improve our services. Please take a few minutes to complete the written survey that you may receive in the mail after your visit with us. Thank you!             Your Updated Medication List - Protect others around you: Learn how to safely use, store and throw away your medicines at www.disposemymeds.org.          This list is accurate as of 2/25/18  1:00 PM.  Always use your most recent med list.                   Brand Name Dispense Instructions for use Diagnosis    hydrOXYzine 25 MG tablet    ATARAX    30 tablet    Take 1 tablet (25 mg) by mouth every 8 hours as needed for anxiety    Panic attack

## 2018-02-25 NOTE — NURSING NOTE
"Chief Complaint   Patient presents with     Urgent Care     Anxiety     h/o anxiety and has been doing well for past 5 years. Usually can talk though it. Started some in December but was ok until recently. Feeling anxiety and down.        Initial /70  Pulse 80  Temp 97.9  F (36.6  C) (Oral)  Resp 16  Ht 6' 2.75\" (1.899 m)  Wt 242 lb (109.8 kg)  SpO2 99%  BMI 30.45 kg/m2 Estimated body mass index is 30.45 kg/(m^2) as calculated from the following:    Height as of this encounter: 6' 2.75\" (1.899 m).    Weight as of this encounter: 242 lb (109.8 kg).  Medication Reconciliation: complete  "

## 2018-02-26 ENCOUNTER — OFFICE VISIT (OUTPATIENT)
Dept: FAMILY MEDICINE | Facility: CLINIC | Age: 28
End: 2018-02-26
Payer: COMMERCIAL

## 2018-02-26 VITALS
DIASTOLIC BLOOD PRESSURE: 87 MMHG | BODY MASS INDEX: 30.84 KG/M2 | SYSTOLIC BLOOD PRESSURE: 136 MMHG | TEMPERATURE: 98.3 F | WEIGHT: 248 LBS | HEART RATE: 86 BPM | HEIGHT: 75 IN

## 2018-02-26 DIAGNOSIS — F41.9 ANXIETY: Primary | ICD-10-CM

## 2018-02-26 PROCEDURE — 99214 OFFICE O/P EST MOD 30 MIN: CPT | Performed by: NURSE PRACTITIONER

## 2018-02-26 RX ORDER — ESCITALOPRAM OXALATE 10 MG/1
10 TABLET ORAL DAILY
Qty: 30 TABLET | Refills: 1 | Status: SHIPPED | OUTPATIENT
Start: 2018-02-26 | End: 2018-05-01

## 2018-02-26 ASSESSMENT — PATIENT HEALTH QUESTIONNAIRE - PHQ9: SUM OF ALL RESPONSES TO PHQ QUESTIONS 1-9: 9

## 2018-02-26 ASSESSMENT — ANXIETY QUESTIONNAIRES: GAD7 TOTAL SCORE: 18

## 2018-02-26 NOTE — PROGRESS NOTES
"  SUBJECTIVE:   Travis Carbone is a 27 year old male who presents to clinic today for the following health issues:    Establish care with primary  ED/UC Followup:    Facility:  Urgent care follow up  Date of visit: 2/25/2018  Reason for visit: panic attack, anxiety issue.  Excessive worry, insomnia, sleep disturbance, difficulty concentrating  Panic attacks.  Stress at work  Current Status:  No change from above.         Depression and Anxiety Follow-Up    Status since last visit: Worsened anxiety    Other associated symptoms: feeling down.    Complicating factors:     Significant life event: Yes-  Moving, living with in-laws currently     Current substance abuse: None    PHQ-9 2/25/2018   Total Score 9   Q9: Suicide Ideation Not at all     ASTRID-7 SCORE 2/25/2018   Total Score 18       Problem list and histories reviewed & adjusted, as indicated.  Additional history: as documented      Reviewed and updated as needed this visit by clinical staff  Tobacco  Allergies  Meds  Med Hx  Surg Hx  Fam Hx  Soc Hx      Reviewed and updated as needed this visit by Provider         ROS:  Constitutional, HEENT, cardiovascular, pulmonary, gi and gu systems are negative, except as otherwise noted.    OBJECTIVE:     /87  Pulse 86  Temp 98.3  F (36.8  C) (Tympanic)  Ht 6' 2.75\" (1.899 m)  Wt 248 lb (112.5 kg)  BMI 31.21 kg/m2  Body mass index is 31.21 kg/(m^2).  GENERAL: healthy, alert and no distress  PSYCH: mentation appears normal, affect normal/bright      ASSESSMENT/PLAN:       ICD-10-CM    1. Anxiety F41.9 escitalopram (LEXAPRO) 10 MG tablet       Patient Instructions   Start Lexapro (escitalopram): 10 mg daily    Discussed risks/bennefits and possible side effects of antidepressants, including but not limited to GI upset, disrupted sleep, loss of libido, worsening of mood or even possible risk of increased suicidal thoughts.  These medications often take 4-6 weeks to be effective.    Also discussed the " importance of using them for 6-9 months before stopping, to avoid rebound symptoms.   Contact the clinic if having any adverse effects.  Do not stop the medication abruptly.    Verbal contract for saftey discussed, patient should contact the clinic or 24 hour nurse line if any thoughts of self harm.    Follow up in one month or sooner if problems.          The risks, benefits and treatment options of prescribed medications or other treatments have been discussed with the patient. The patient verbalized their understanding and should call or follow up if no improvement or if they develop further problems.      ROBIN Kate [facility]

## 2018-02-26 NOTE — NURSING NOTE
"Chief Complaint   Patient presents with     Anxiety       Initial /87 (BP Location: Right arm)  Pulse 86  Temp 98.3  F (36.8  C) (Tympanic)  Ht 6' 2.75\" (1.899 m)  Wt 248 lb (112.5 kg)  BMI 31.21 kg/m2 Estimated body mass index is 31.21 kg/(m^2) as calculated from the following:    Height as of this encounter: 6' 2.75\" (1.899 m).    Weight as of this encounter: 248 lb (112.5 kg).  Medication Reconciliation: complete  "

## 2018-02-26 NOTE — LETTER
My Depression Action Plan  Name: Travis Carbone   Date of Birth 1990  Date: 2/26/2018    My doctor: No Ref-Primary, Physician   My clinic: [facility]  [address] [address] [phone]          GREEN    ZONE   Good Control    What it looks like:     Things are going generally well. You have normal up s and down s. You may even feel depressed from time to time, but bad moods usually last less than a day.   What you need to do:  1. Continue to care for yourself (see self care plan)  2. Check your depression survival kit and update it as needed  3. Follow your physician s recommendations including any medication.  4. Do not stop taking medication unless you consult with your physician first.           YELLOW         ZONE Getting Worse    What it looks like:     Depression is starting to interfere with your life.     It may be hard to get out of bed; you may be starting to isolate yourself from others.    Symptoms of depression are starting to last most all day and this has happened for several days.     You may have suicidal thoughts but they are not constant.   What you need to do:     1. Call your care team, your response to treatment will improve if you keep your care team informed of your progress. Yellow periods are signs an adjustment may need to be made.     2. Continue your self-care, even if you have to fake it!    3. Talk to someone in your support network    4. Open up your depression survival kit           RED    ZONE Medical Alert - Get Help    What it looks like:     Depression is seriously interfering with your life.     You may experience these or other symptoms: You can t get out of bed most days, can t work or engage in other necessary activities, you have trouble taking care of basic hygiene, or basic responsibilities, thoughts of suicide or death that will not go away, self-injurious behavior.     What you need to do:  1. Call your care  team and request a same-day appointment. If they are not available (weekends or after hours) call your local crisis line, emergency room or 911.      Electronically signed by: Kristin Perez, February 26, 2018    Depression Self Care Plan / Survival Kit    Self-Care for Depression  Here s the deal. Your body and mind are really not as separate as most people think.  What you do and think affects how you feel and how you feel influences what you do and think. This means if you do things that people who feel good do, it will help you feel better.  Sometimes this is all it takes.  There is also a place for medication and therapy depending on how severe your depression is, so be sure to consult with your medical provider and/ or Behavioral Health Consultant if your symptoms are worsening or not improving.     In order to better manage my stress, I will:    Exercise  Get some form of exercise, every day. This will help reduce pain and release endorphins, the  feel good  chemicals in your brain. This is almost as good as taking antidepressants!  This is not the same as joining a gym and then never going! (they count on that by the way ) It can be as simple as just going for a walk or doing some gardening, anything that will get you moving.      Hygiene   Maintain good hygiene (Get out of bed in the morning, Make your bed, Brush your teeth, Take a shower, and Get dressed like you were going to work, even if you are unemployed).  If your clothes don't fit try to get ones that do.    Diet  I will strive to eat foods that are good for me, drink plenty of water, and avoid excessive sugar, caffeine, alcohol, and other mood-altering substances.  Some foods that are helpful in depression are: complex carbohydrates, B vitamins, flaxseed, fish or fish oil, fresh fruits and vegetables.    Psychotherapy  I agree to participate in Individual Therapy (if recommended).    Medication  If prescribed medications, I agree to take them.   Missing doses can result in serious side effects.  I understand that drinking alcohol, or other illicit drug use, may cause potential side effects.  I will not stop my medication abruptly without first discussing it with my provider.    Staying Connected With Others  I will stay in touch with my friends, family members, and my primary care provider/team.    Use your imagination  Be creative.  We all have a creative side; it doesn t matter if it s oil painting, sand castles, or mud pies! This will also kick up the endorphins.    Witness Beauty  (AKA stop and smell the roses) Take a look outside, even in mid-winter. Notice colors, textures. Watch the squirrels and birds.     Service to others  Be of service to others.  There is always someone else in need.  By helping others we can  get out of ourselves  and remember the really important things.  This also provides opportunities for practicing all the other parts of the program.    Humor  Laugh and be silly!  Adjust your TV habits for less news and crime-drama and more comedy.    Control your stress  Try breathing deep, massage therapy, biofeedback, and meditation. Find time to relax each day.     My support system    Clinic Contact:  Phone number:    Contact 1:  Phone number:    Contact 2:  Phone number:    Jainism/:  Phone number:    Therapist:  Phone number:    Local crisis center:    Phone number:    Other community support:  Phone number:

## 2018-02-26 NOTE — MR AVS SNAPSHOT
After Visit Summary   2/26/2018    Travis Carbone    MRN: [id number]           Patient Information     Date Of Birth          1990        Visit Information        Provider Department      2/26/2018 3:00 PM Zhane Cruz APRN [facility]        Today's Diagnoses     Anxiety    -  1      Care Instructions    Start Lexapro (escitalopram): 10 mg daily    Discussed risks/bennefits and possible side effects of antidepressants, including but not limited to GI upset, disrupted sleep, loss of libido, worsening of mood or even possible risk of increased suicidal thoughts.  These medications often take 4-6 weeks to be effective.    Also discussed the importance of using them for 6-9 months before stopping, to avoid rebound symptoms.   Contact the clinic if having any adverse effects.  Do not stop the medication abruptly.    Verbal contract for john discussed, patient should contact the clinic or 24 hour nurse line if any thoughts of self harm.    Follow up in one month or sooner if problems.            Thank you for choosing [facility].  You may be receiving a survey in the mail from [employer] Doming[employer] regarding your visit today.  Please take a few minutes to complete and return the survey to let us know how we are doing.      If you have questions or concerns, please contact us via [employer] or you can contact your care team at [phone].    Our Clinic hours are:  Monday 6:40 am  to 7:00 pm  Tuesday -Friday 6:40 am to 5:00 pm    The [place] outpatient lab hours are:  Monday - Friday 6:10 am to 4:45 pm  Saturdays 7:00 am to 11:00 am  Appointments are required, call [phone]    If you have clinical questions after hours or would like to schedule an appointment,  call the clinic at [phone].            Follow-ups after your visit        Who to contact     If you have questions or need follow up information about today's clinic visit or your schedule please contact  "[facility] directly at [phone].  Normal or non-critical lab and imaging results will be communicated to you by MyChart, letter or phone within 4 business days after the clinic has received the results. If you do not hear from us within 7 days, please contact the clinic through MyChart or phone. If you have a critical or abnormal lab result, we will notify you by phone as soon as possible.  Submit refill requests through [employer] or call your pharmacy and they will forward the refill request to us. Please allow 3 business days for your refill to be completed.          Additional Information About Your Visit        [employer] Information     [employer] lets you send messages to your doctor, view your test results, renew your prescriptions, schedule appointments and more. To sign up, go to [email or web address] . Click on \"Log in\" on the left side of the screen, which will take you to the Welcome page. Then click on \"Sign up Now\" on the right side of the page.     You will be asked to enter the access code listed below, as well as some personal information. Please follow the directions to create your username and password.     Your access code is: [email or web address]  Expires: 3/16/2018 11:27 AM     Your access code will  in 90 days. If you need help or a new code, please call your [place] clinic or [phone].        Care EveryWhere ID     This is your Care EveryWhere ID. This could be used by other organizations to access your [place] medical records  [id number]        Your Vitals Were     Pulse Temperature Height BMI (Body Mass Index)          86 98.3  F (36.8  C) (Tympanic) 6' 2.75\" (1.899 m) 31.21 kg/m2         Blood Pressure from Last 3 Encounters:   18 141/87   18 128/70   17 154/80    Weight from Last 3 Encounters:   18 248 lb (112.5 kg)   18 242 lb (109.8 kg)   17 258 lb 6.4 oz (117.2 kg)              Today, you had the following     No orders found for " display         Today's Medication Changes          These changes are accurate as of 2/26/18  3:19 PM.  If you have any questions, ask your nurse or doctor.               Start taking these medicines.        Dose/Directions    escitalopram 10 MG tablet   Commonly known as:  LEXAPRO   Used for:  Anxiety   Started by:  Zhane Cruz APRN CNP        Dose:  10 mg   Take 1 tablet (10 mg) by mouth daily   Quantity:  30 tablet   Refills:  1            Where to get your medicines      These medications were sent to [facility] [address]  [address] [address]     Phone:  [phone]     escitalopram 10 MG tablet                Primary Care Provider Fax #    Physician No Ref-Primary [phone]       No address on file        Equal Access to Services     DAE BAXTER : David Fang, waaxda luqadaha, qaybta kaalmada adekaliyada, eric keenan. So [facility] [phone].    ATENCIÓN: Si habla español, tiene a sullivan disposición servicios gratuitos de asistencia lingüística. Llame al [phone].    We comply with applicable federal civil rights laws and [place] laws. We do not discriminate on the basis of race, color, national origin, age, disability, sex, sexual orientation, or gender identity.            Thank you!     Thank you for choosing [facility]  for your care. Our goal is always to provide you with excellent care. Hearing back from our patients is one way we can continue to improve our services. Please take a few minutes to complete the written survey that you may receive in the mail after your visit with us. Thank you!             Your Updated Medication List - Protect others around you: Learn how to safely use, store and throw away your medicines at www.disposemymeds.org.          This list is accurate as of 2/26/18  3:19 PM.  Always use your most recent med list.                    Brand Name Dispense Instructions for use Diagnosis    escitalopram 10 MG tablet    LEXAPRO    30 tablet    Take 1 tablet (10 mg) by mouth daily    Anxiety       hydrOXYzine 25 MG tablet    ATARAX    30 tablet    Take 1 tablet (25 mg) by mouth every 8 hours as needed for anxiety    Panic attack

## 2018-02-26 NOTE — PATIENT INSTRUCTIONS
Start Lexapro (escitalopram): 10 mg daily    Discussed risks/bennefits and possible side effects of antidepressants, including but not limited to GI upset, disrupted sleep, loss of libido, worsening of mood or even possible risk of increased suicidal thoughts.  These medications often take 4-6 weeks to be effective.    Also discussed the importance of using them for 6-9 months before stopping, to avoid rebound symptoms.   Contact the clinic if having any adverse effects.  Do not stop the medication abruptly.    Verbal contract for john discussed, patient should contact the clinic or 24 hour nurse line if any thoughts of self harm.    Follow up in one month or sooner if problems.            Thank you for choosing [facility].  You may be receiving a survey in the mail from [employer] regarding your visit today.  Please take a few minutes to complete and return the survey to let us know how we are doing.      If you have questions or concerns, please contact us via [employer] or you can contact your care team at [phone].    Our Clinic hours are:  Monday 6:40 am  to 7:00 pm  Tuesday -Friday 6:40 am to 5:00 pm    The [place] outpatient lab hours are:  Monday - Friday 6:10 am to 4:45 pm  Saturdays 7:00 am to 11:00 am  Appointments are required, call [phone]    If you have clinical questions after hours or would like to schedule an appointment,  call the clinic at [phone].

## 2018-02-27 ENCOUNTER — OFFICE VISIT (OUTPATIENT)
Dept: BEHAVIORAL HEALTH | Facility: CLINIC | Age: 28
End: 2018-02-27
Payer: COMMERCIAL

## 2018-02-27 DIAGNOSIS — F41.1 GAD (GENERALIZED ANXIETY DISORDER): Primary | ICD-10-CM

## 2018-02-27 PROCEDURE — 90791 PSYCH DIAGNOSTIC EVALUATION: CPT | Performed by: SOCIAL WORKER

## 2018-02-27 ASSESSMENT — ANXIETY QUESTIONNAIRES
1. FEELING NERVOUS, ANXIOUS, OR ON EDGE: NEARLY EVERY DAY
6. BECOMING EASILY ANNOYED OR IRRITABLE: NOT AT ALL
3. WORRYING TOO MUCH ABOUT DIFFERENT THINGS: MORE THAN HALF THE DAYS
4. TROUBLE RELAXING: MORE THAN HALF THE DAYS
GAD7 TOTAL SCORE: 15
IF YOU CHECKED OFF ANY PROBLEMS ON THIS QUESTIONNAIRE, HOW DIFFICULT HAVE THESE PROBLEMS MADE IT FOR YOU TO DO YOUR WORK, TAKE CARE OF THINGS AT HOME, OR GET ALONG WITH OTHER PEOPLE: NOT DIFFICULT AT ALL
2. NOT BEING ABLE TO STOP OR CONTROL WORRYING: NEARLY EVERY DAY
5. BEING SO RESTLESS THAT IT IS HARD TO SIT STILL: MORE THAN HALF THE DAYS
7. FEELING AFRAID AS IF SOMETHING AWFUL MIGHT HAPPEN: NEARLY EVERY DAY

## 2018-02-27 NOTE — MR AVS SNAPSHOT
"              After Visit Summary   2/27/2018    Travis Carbone    MRN: [id number]           Patient Information     Date Of Birth          1990        Visit Information        Provider Department      2/27/2018 2:30 PM Peyton Orosco LICSW [facility]        Today's Diagnoses     ASTRID (generalized anxiety disorder)    -  1       Follow-ups after your visit        Your next 10 appointments already scheduled     Mar 13, 2018  4:00 PM CDT   Return Visit with MOHSEN Mar   [facility]    [address] [address]   [phone]              Who to contact     If you have questions or need follow up information about today's clinic visit or your schedule please contact [facility] directly at [phone].  Normal or non-critical lab and imaging results will be communicated to you by MyChart, letter or phone within 4 business days after the clinic has received the results. If you do not hear from us within 7 days, please contact the clinic through MyChart or phone. If you have a critical or abnormal lab result, we will notify you by phone as soon as possible.  Submit refill requests through [employer] or call your pharmacy and they will forward the refill request to us. Please allow 3 business days for your refill to be completed.          Additional Information About Your Visit        MyChart Information     [employer] lets you send messages to your doctor, view your test results, renew your prescriptions, schedule appointments and more. To sign up, go to [email or web address] . Click on \"Log in\" on the left side of the screen, which will take you to the Welcome page. Then click on \"Sign up Now\" on the right side of the page.     You will be asked to enter the access code listed below, as well as some personal information. Please follow the directions to create your username and password.     Your access " code is: [email or web address]  Expires: 3/16/2018 11:27 AM     Your access code will  in 90 days. If you need help or a new code, please call your [place] clinic or [phone].        Care EveryWhere ID     This is your Care EveryWhere ID. This could be used by other organizations to access your [place] medical records  [id number]         Blood Pressure from Last 3 Encounters:   18 136/87   18 128/70   17 154/80    Weight from Last 3 Encounters:   18 248 lb (112.5 kg)   18 242 lb (109.8 kg)   17 258 lb 6.4 oz (117.2 kg)              Today, you had the following     No orders found for display       Primary Care Provider Fax #    Physician No Ref-Primary [phone]       No address on file        Equal Access to Services     [facility] : Hadii shashank Fang, waaxda lugiovanna, qaybta kaalmada lux, eric ramos . So [facility] [phone].    ATENCIÓN: Si habla español, tiene a sullivan disposición servicios gratuitos de asistencia lingüística. Monica al [phone].    We comply with applicable federal civil rights laws and [place] laws. We do not discriminate on the basis of race, color, national origin, age, disability, sex, sexual orientation, or gender identity.            Thank you!     Thank you for choosing [facility]  for your care. Our goal is always to provide you with excellent care. Hearing back from our patients is one way we can continue to improve our services. Please take a few minutes to complete the written survey that you may receive in the mail after your visit with us. Thank you!             Your Updated Medication List - Protect others around you: Learn how to safely use, store and throw away your medicines at www.disposemymeds.org.          This list is accurate as of 18 11:59 PM.  Always use your most recent med list.                   Brand Name Dispense Instructions for use Diagnosis     escitalopram 10 MG tablet    LEXAPRO    30 tablet    Take 1 tablet (10 mg) by mouth daily    Anxiety       hydrOXYzine 25 MG tablet    ATARAX    30 tablet    Take 1 tablet (25 mg) by mouth every 8 hours as needed for anxiety    Panic attack

## 2018-02-28 ASSESSMENT — PATIENT HEALTH QUESTIONNAIRE - PHQ9: SUM OF ALL RESPONSES TO PHQ QUESTIONS 1-9: 8

## 2018-02-28 ASSESSMENT — ANXIETY QUESTIONNAIRES: GAD7 TOTAL SCORE: 15

## 2018-03-05 ENCOUNTER — OFFICE VISIT (OUTPATIENT)
Dept: BEHAVIORAL HEALTH | Facility: CLINIC | Age: 28
End: 2018-03-05
Payer: COMMERCIAL

## 2018-03-05 DIAGNOSIS — F41.1 GAD (GENERALIZED ANXIETY DISORDER): Primary | ICD-10-CM

## 2018-03-05 PROCEDURE — 90832 PSYTX W PT 30 MINUTES: CPT | Performed by: SOCIAL WORKER

## 2018-03-05 NOTE — PROGRESS NOTES
[facility]  Integrated Behavioral Health Services   Diagnostic Assessment      PATIENT'S NAME: Travis Carbone  MRN:   [id number]  :   1990  DATE OF SERVICE: 2018  SERVICE LOCATION: Face to Face in Clinic  Visit Activities: NEW and [facility] Only      Identifying Information:  Patient is a 27 year old year old, ,  male.  Patient attended the session alone.        Referral:  Patient was referred for an assessment by PCP at [facility].   Reason for referral: determine behavioral health treatment options, assess client readiness and motivation to change and assess client social situation.       Patient's Statement of Presenting Concern:  Patient reports the following reason(s) for seeking an assessment at this time: anxiety has increased over the past month.  Patient stated that his symptoms have resulted in the following functional impairments: health maintenance, home life with spouse and in-laws, management of the household and or completion of tasks, relationship(s), self-care and work / vocational responsibilities      History of Presenting Concern:  Patient reports that these problem(s) began during childhood. He began noticing obsessive thoughts in 4th-5th grade.  His obsessive thoughts lead to panic attacks at this point along with his intense fear of losing control.  Patient has attempted to resolve these concerns in the past through: counseling and medication(s) from physician / PCP. Patient reports that other professional(s) are involved in providing support / services - his primary care provider.      Social History:  Patient reported he grew up in [place], [place].  He is the first born of 2 children.  He also has 2 step siblings.  Patient reported that his childhood was good.  He reports his parents  and even though it was stressful at that time he has a step dad who is very supportive and he is also close to both  parents.  Patient reported a history of 3 committed relationships or marriages. Patient has been  for 2 years. Patient reported having 0 children. Patient identified some stable and meaningful social connections.     Patient lives with Wife and in-laws at this time.  He and his wife bought a house and will be moving end of March.  Patient is currently employed full time.  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  Patient's highest education level was associate degree / vocational certificate. Patient did identify the following learning problems: concentration. There are no ethnic, cultural or Congregation factors that may be relevant for therapy.  Patient is currently serving in the [employer].    Mental Health History:  Patient reported the following biological family members or relatives with mental health issues: Father experienced Anxiety, Maternal Grandmother experienced Anxiety, Paternal Grandmother experienced Anxiety and Obsessive compulsive disorder, Brother experienced Anxiety. Patient has received the following mental health services in the past: counseling and medication(s) from physician / PCP. Patient is currently receiving the following services: medication(s) from physician / PCP.      Chemical Health History:  Patient reported the following biological family members or relatives with chemical health issues: Maternal Grandmother reportedly uses prescription drugs . Patient has not received chemical dependency treatment in the past. Patient is not currently receiving any chemical dependency treatment. Patient reports no problems as a result of their drinking / drug use.      Cage-AID score is: Negative.  Based on Cage-Aid score and clinical interview there  are not indications of drug or alcohol abuse.      Discussed the general effects of drugs and alcohol on health and  well-being.      Significant Losses / Trauma / Abuse / Neglect Issues:  There are indications or report of significant loss, trauma, abuse or neglect issues related to: death of Grandfather August 2017.    Issues of possible neglect are not present.      Medical History:   There is no problem list on file for this patient.      Medication Review:  Current Outpatient Prescriptions   Medication     escitalopram (LEXAPRO) 10 MG tablet     hydrOXYzine (ATARAX) 25 MG tablet     No current facility-administered medications for this visit.        Patient was provided recommendation to follow-up with physician.    Mental Status Assessment:  Appearance:   Appropriate   Eye Contact:   Good   Psychomotor Behavior: Normal   Attitude:   Cooperative   Orientation:   All  Speech   Rate / Production: Normal    Volume:  Normal   Mood:    Anxious   Affect:    Appropriate   Thought Content:  Clear   Thought Form:  Coherent  Logical   Insight:    Good       Safety Assessment:    Patient has had a history of suicidal ideation: Ideation only with increased anxiety-never a plan  Patient denies current or recent suicidal ideation or behaviors.  Patient denies current or recent homicidal ideation or behaviors.  Patient denies current or recent self injurious behavior or ideation.  Patient denies other safety concerns.  Patient reports there are no firearms in the house  Protective Factors Sense of responsibility to family, Religiosity, Life Satisfaction, Reality testing ability, Positive coping skills, Positive problem-solving skills, Positive social support and Positive therapeutic releationships   Risk Factors Abrupt changes in clinical condition      Plan for Safety and Risk Management:  A safety and risk management plan has not been developed at this time, however patient was encouraged to call [facility] should there be a change in any of these risk factors.      Review of Symptoms:  Depression: Sleep Concentration Depressed  mood  Ann:  No symptoms  Psychosis: No symptoms  Anxiety: Worries Nervousness Feeling nervous anxious or on edge, unable to stop or control worrying thoughts, worrying too much about different things, trouble relaxing, restlessness, and feeling afraid as if something awful might happen  Panic:  No symptoms  Post Traumatic Stress Disorder: No symptoms  Obsessive Compulsive Disorder: Thoughts -repetitive  Eating Disorder: No symptoms  Oppositional Defiant Disorder: No symptoms  ADD / ADHD: No symptoms  Conduct Disorder: No symptoms    Patient's Strengths and Limitations:  Patient identified the following strengths or resources that will help him succeed in counseling: Oriental orthodox, commitment to health and well being, vaibhav / spirituality, friends / good social support, family support, intelligence and sense of humor. Patient identified the following supports: community involvement, family, Church / spirituality and friends. Things that may interfere with the patien'ts success in behavioral health services include:Patient reports none.    Diagnostic Criteria:  A. Excessive anxiety and worry about a number of events or activities (such as work or school performance).   B. The person finds it difficult to control the worry.   - Restlessness or feeling keyed up or on edge.    - Being easily fatigued.    - Difficulty concentrating or mind going blank.    - Muscle tension.    - Sleep disturbance (difficulty falling or staying asleep, or restless unsatisfying sleep).   D. The focus of the anxiety and worry is not confined to features of an Axis I disorder.  E. The anxiety, worry, or physical symptoms cause clinically significant distress or impairment in social, occupational, or other important areas of functioning.   F. The disturbance is not due to the direct physiological effects of a substance (e.g., a drug of abuse, a medication) or a general medical condition (e.g., hyperthyroidism) and does not occur exclusively  during a Mood Disorder, a Psychotic Disorder, or a Pervasive Developmental Disorder.    - The aformentioned symptoms began 1 month(s) ago and occurs 7 days per week and is experienced as mild.  He also reports symptoms are variable -  moderate to severe at times.      Functional Status:  Patient's symptoms have caused and are causing reduced functional status in the following areas: Academics / Education - Impairs ability to concentrate and motivation to continue classes  Occupational / Vocational - Impairs ability to concentrate      DSM5 Diagnoses: (Sustained by DSM5 Criteria Listed Above)  Diagnoses: 300.02 (F41.1) Generalized Anxiety Disorder  Psychosocial & Contextual Factors: Patient is 27 years old-anxiety since childhood  WHODAS Score: 14  See Media section of EPIC medical record for completed WHODAS    Preliminary Treatment Plan:    The client reports no currently identified Sikh, ethnic or cultural issues relevant to therapy.    Initial Treatment will focus on: Anxiety - Decrease  Functional Impairment at: school and work.    Chemical dependency recommendations: No indications of CD issues    As a preliminary treatment goal, patient will experience a reduction in anxiety, will develop more effective coping skills to manage anxiety symptoms, will develop healthy cognitive patterns and beliefs and will increase ability to function adaptively and will effectively address problems that interfere with adaptive functioning.    The focus of initial interventions will be to alleviate anxiety, alleviate obsessional thinking, facilitate appropriate expression of feelings, increase ability to function adaptively, increase coping skills, provide homework to reinforce skill development, reduce panic attacks, teach distress tolerance skills, teach relaxation strategies and teach stress mangement techniques.    The patient is receiving treatment / structured support from the following professional(s) / service and  treatment. Collaboration will be initiated with: primary care physician.    Referral to another professional/service is not indicated at this time.  Will continue to assess as needed.    A Release of Information is not needed at this time.    Report to child or adult protection services was DAISY Orosco, [facility], Behavioral Health Clinician

## 2018-03-05 NOTE — MR AVS SNAPSHOT
"              After Visit Summary   3/5/2018    Travis Carbone    MRN: [id number]           Patient Information     Date Of Birth          1990        Visit Information        Provider Department      3/5/2018 4:30 PM Peyton Orosco LICSW [facility]        Today's Diagnoses     ASTRID (generalized anxiety disorder)    -  1       Follow-ups after your visit        Who to contact     If you have questions or need follow up information about today's clinic visit or your schedule please contact [facility] directly at [phone].  Normal or non-critical lab and imaging results will be communicated to you by [employer], letter or phone within 4 business days after the clinic has received the results. If you do not hear from us within 7 days, please contact the clinic through [employer] or phone. If you have a critical or abnormal lab result, we will notify you by phone as soon as possible.  Submit refill requests through [employer] or call your pharmacy and they will forward the refill request to us. Please allow 3 business days for your refill to be completed.          Additional Information About Your Visit        MyChart Information     [employer] lets you send messages to your doctor, view your test results, renew your prescriptions, schedule appointments and more. To sign up, go to [email or web address] . Click on \"Log in\" on the left side of the screen, which will take you to the Welcome page. Then click on \"Sign up Now\" on the right side of the page.     You will be asked to enter the access code listed below, as well as some personal information. Please follow the directions to create your username and password.     Your access code is: [email or web address]  Expires: 2018  5:45 PM     Your access code will  in 90 days. If you need help or a new code, please call your [facility] or [phone].        Care EveryWhere ID     This is your Care EveryWhere ID. This could be used " by other organizations to access your [place] medical records  [id number]         Blood Pressure from Last 3 Encounters:   03/18/18 135/89   02/26/18 136/87   02/25/18 128/70    Weight from Last 3 Encounters:   02/26/18 248 lb (112.5 kg)   02/25/18 242 lb (109.8 kg)   12/16/17 258 lb 6.4 oz (117.2 kg)              Today, you had the following     No orders found for display       Primary Care Provider Fax #    Physician No Ref-Primary [phone]       No address on file        Equal Access to Services     [facility] : Hadii shashank Fang, waelaine vargas, tianna kaalmano wasserman, eric ramos . So [facility] [phone].    ATENCIÓN: Si habla español, tiene a sullivan disposición servicios gratuitos de asistencia lingüística. [facility] [phone].    We comply with applicable federal civil rights laws and [place] laws. We do not discriminate on the basis of race, color, national origin, age, disability, sex, sexual orientation, or gender identity.            Thank you!     Thank you for choosing [facility]  for your care. Our goal is always to provide you with excellent care. Hearing back from our patients is one way we can continue to improve our services. Please take a few minutes to complete the written survey that you may receive in the mail after your visit with us. Thank you!             Your Updated Medication List - Protect others around you: Learn how to safely use, store and throw away your medicines at www.disposemymeds.org.          This list is accurate as of 3/5/18 11:59 PM.  Always use your most recent med list.                   Brand Name Dispense Instructions for use Diagnosis    escitalopram 10 MG tablet    LEXAPRO    30 tablet    Take 1 tablet (10 mg) by mouth daily    Anxiety       hydrOXYzine 25 MG tablet    ATARAX    30 tablet    Take 1 tablet (25 mg) by mouth every 8 hours as needed for anxiety    Panic attack

## 2018-03-06 PROBLEM — F41.9 ANXIETY: Status: ACTIVE | Noted: 2018-03-06

## 2018-03-06 ASSESSMENT — PATIENT HEALTH QUESTIONNAIRE - PHQ9: SUM OF ALL RESPONSES TO PHQ QUESTIONS 1-9: 6

## 2018-03-13 ENCOUNTER — OFFICE VISIT (OUTPATIENT)
Dept: BEHAVIORAL HEALTH | Facility: CLINIC | Age: 28
End: 2018-03-13
Payer: COMMERCIAL

## 2018-03-13 DIAGNOSIS — F41.1 GAD (GENERALIZED ANXIETY DISORDER): Primary | ICD-10-CM

## 2018-03-13 PROCEDURE — 99207 ZZC NO CHARGE LOS: CPT | Performed by: SOCIAL WORKER

## 2018-03-13 ASSESSMENT — ANXIETY QUESTIONNAIRES
IF YOU CHECKED OFF ANY PROBLEMS ON THIS QUESTIONNAIRE, HOW DIFFICULT HAVE THESE PROBLEMS MADE IT FOR YOU TO DO YOUR WORK, TAKE CARE OF THINGS AT HOME, OR GET ALONG WITH OTHER PEOPLE: SOMEWHAT DIFFICULT
7. FEELING AFRAID AS IF SOMETHING AWFUL MIGHT HAPPEN: SEVERAL DAYS
1. FEELING NERVOUS, ANXIOUS, OR ON EDGE: SEVERAL DAYS
6. BECOMING EASILY ANNOYED OR IRRITABLE: NOT AT ALL
3. WORRYING TOO MUCH ABOUT DIFFERENT THINGS: SEVERAL DAYS
5. BEING SO RESTLESS THAT IT IS HARD TO SIT STILL: SEVERAL DAYS
GAD7 TOTAL SCORE: 6
2. NOT BEING ABLE TO STOP OR CONTROL WORRYING: SEVERAL DAYS

## 2018-03-13 ASSESSMENT — PATIENT HEALTH QUESTIONNAIRE - PHQ9: 5. POOR APPETITE OR OVEREATING: SEVERAL DAYS

## 2018-03-13 NOTE — MR AVS SNAPSHOT
"              After Visit Summary   3/13/2018    Travis Carbone    MRN: [id number]           Patient Information     Date Of Birth          1990        Visit Information        Provider Department      3/13/2018 4:00 PM Peyton Orosco LICSW [facility]        Today's Diagnoses     ASTRID (generalized anxiety disorder)    -  1       Follow-ups after your visit        Who to contact     If you have questions or need follow up information about today's clinic visit or your schedule please contact [facility] directly at [phone].  Normal or non-critical lab and imaging results will be communicated to you by "[employer]hart, letter or phone within 4 business days after the clinic has received the results. If you do not hear from us within 7 days, please contact the clinic through "[employer]hart or phone. If you have a critical or abnormal lab result, we will notify you by phone as soon as possible.  Submit refill requests through [employer] or call your pharmacy and they will forward the refill request to us. Please allow 3 business days for your refill to be completed.          Additional Information About Your Visit        MyChart Information     [employer] lets you send messages to your doctor, view your test results, renew your prescriptions, schedule appointments and more. To sign up, go to [email or web address] . Click on \"Log in\" on the left side of the screen, which will take you to the Welcome page. Then click on \"Sign up Now\" on the right side of the page.     You will be asked to enter the access code listed below, as well as some personal information. Please follow the directions to create your username and password.     Your access code is: [email or web address]  Expires: 2018  5:45 PM     Your access code will  in 90 days. If you need help or a new code, please call your [facility] or [phone].        Care EveryWhere ID     This is your Care EveryWhere ID. This could be " used by other organizations to access your [place] medical records  [id number]         Blood Pressure from Last 3 Encounters:   03/18/18 135/89   02/26/18 136/87   02/25/18 128/70    Weight from Last 3 Encounters:   02/26/18 248 lb (112.5 kg)   02/25/18 242 lb (109.8 kg)   12/16/17 258 lb 6.4 oz (117.2 kg)              Today, you had the following     No orders found for display       Primary Care Provider Fax #    Physician No Ref-Primary [phone]       No address on file        Equal Access to Services     [facility] : Hadii shashank Fang, wamadhavda alicia, tianna kaalmano wasserman, eric ramos . So [facility] [phone].    ATENCIÓN: Si habla español, tiene a sullivan disposición servicios gratuitos de asistencia lingüística. LlAshtabula County [facility] [phone].    We comply with applicable federal civil rights laws and [place] laws. We do not discriminate on the basis of race, color, national origin, age, disability, sex, sexual orientation, or gender identity.            Thank you!     Thank you for choosing [facility]  for your care. Our goal is always to provide you with excellent care. Hearing back from our patients is one way we can continue to improve our services. Please take a few minutes to complete the written survey that you may receive in the mail after your visit with us. Thank you!             Your Updated Medication List - Protect others around you: Learn how to safely use, store and throw away your medicines at www.disposemymeds.org.          This list is accurate as of 3/13/18 11:59 PM.  Always use your most recent med list.                   Brand Name Dispense Instructions for use Diagnosis    escitalopram 10 MG tablet    LEXAPRO    30 tablet    Take 1 tablet (10 mg) by mouth daily    Anxiety       hydrOXYzine 25 MG tablet    ATARAX    30 tablet    Take 1 tablet (25 mg) by mouth every 8 hours as needed for anxiety    Panic attack

## 2018-03-14 ASSESSMENT — ANXIETY QUESTIONNAIRES: GAD7 TOTAL SCORE: 6

## 2018-03-14 ASSESSMENT — PATIENT HEALTH QUESTIONNAIRE - PHQ9: SUM OF ALL RESPONSES TO PHQ QUESTIONS 1-9: 1

## 2018-03-18 ENCOUNTER — HOSPITAL ENCOUNTER (EMERGENCY)
Facility: CLINIC | Age: 28
Discharge: HOME | End: 2018-03-18
Attending: PHYSICIAN ASSISTANT | Admitting: PHYSICIAN ASSISTANT
Payer: COMMERCIAL

## 2018-03-18 VITALS
SYSTOLIC BLOOD PRESSURE: 135 MMHG | TEMPERATURE: 97.9 F | DIASTOLIC BLOOD PRESSURE: 89 MMHG | RESPIRATION RATE: 16 BRPM | OXYGEN SATURATION: 97 %

## 2018-03-18 DIAGNOSIS — J06.9 VIRAL UPPER RESPIRATORY TRACT INFECTION WITH COUGH: ICD-10-CM

## 2018-03-18 PROCEDURE — G0463 HOSPITAL OUTPT CLINIC VISIT: HCPCS

## 2018-03-18 PROCEDURE — 99213 OFFICE O/P EST LOW 20 MIN: CPT | Performed by: PHYSICIAN ASSISTANT

## 2018-03-18 NOTE — ED AVS SNAPSHOT
[facility] Emergency Department    [address] [address]    Phone:  [phone]    Fax:  [phone]                                       Travis Carbone   MRN: [id number]    Department:  [facility] Emergency Department   Date of Visit:  3/18/2018           After Visit Summary Signature Page     I have received my discharge instructions, and my questions have been answered. I have discussed any challenges I see with this plan with the nurse or doctor.    ..........................................................................................................................................  Patient/Patient Representative Signature      ..........................................................................................................................................  Patient Representative Print Name and Relationship to Patient    ..................................................               ................................................  Date                                            Time    ..........................................................................................................................................  Reviewed by Signature/Title    ...................................................              ..............................................  Date                                                            Time

## 2018-03-18 NOTE — ED AVS SNAPSHOT
[facility] Emergency Department    [address] [address]    Phone:  [phone]    Fax:  [phone]                                       Travis Carbone   MRN: [id number]    Department:  [facility] Emergency Department   Date of Visit:  3/18/2018           Patient Information     Date Of Birth          1990        Your diagnoses for this visit were:     Viral upper respiratory tract infection with cough        You were seen by Eduardo Amaral PA-C.        Discharge Instructions         Viral Upper Respiratory Illness (Adult)  You have a viral upper respiratory illness (URI), which is another term for the common cold. This illness is contagious during the first few days. It is spread through the air by coughing and sneezing. It may also be spread by direct contact (touching the sick person and then touching your own eyes, nose, or mouth). Frequent handwashing will decrease risk of spread. Most viral illnesses go away within 7 to 10 days with rest and simple home remedies. Sometimes the illness may last for several weeks. Antibiotics will not kill a virus, and they are generally not prescribed for this condition.    Home care    If symptoms are severe, rest at home for the first 2 to 3 days. When you resume activity, don't let yourself get too tired.    Avoid being exposed to cigarette smoke (yours or others ).    You may use acetaminophen or ibuprofen to control pain and fever, unless another medicine was prescribed. (Note: If you have chronic liver or kidney disease, have ever had a stomach ulcer or gastrointestinal bleeding, or are taking blood-thinning medicines, talk with your healthcare provider before using these medicines.) Aspirin should never be given to anyone under 18 years of age who is ill with a viral infection or fever. It may cause severe liver or brain damage.    Your appetite may be poor, so a light diet is fine. Avoid dehydration by drinking 6 to 8  glasses of fluids per day (water, soft drinks, juices, tea, or soup). Extra fluids will help loosen secretions in the nose and lungs.    Over-the-counter cold medicines will not shorten the length of time you re sick, but they may be helpful for the following symptoms: cough, sore throat, and nasal and sinus congestion. (Note: Do not use decongestants if you have high blood pressure.)  Follow-up care  Follow up with your healthcare provider, or as advised.  When to seek medical advice  Call your healthcare provider right away if any of these occur:    Cough with lots of colored sputum (mucus)    Severe headache; face, neck, or ear pain    Difficulty swallowing due to throat pain    Fever of 100.4 F (38 C)  Call 911, or get immediate medical care  Call emergency services right away if any of these occur:    Chest pain, shortness of breath, wheezing, or difficulty breathing    Coughing up blood    Inability to swallow due to throat pain  Date Last Reviewed: 9/13/2015 2000-2017 [employer]. [address] [place] [address]. All rights reserved. This information is not intended as a substitute for professional medical care. Always follow your healthcare professional's instructions.          24 Hour Appointment Hotline       To make an appointment at any [facility], call [phone] ([phone]). If you don't have a family doctor or clinic, we will help you find one. [place] clinics are conveniently located to serve the needs of you and your family.             Review of your medicines      Our records show that you are taking the medicines listed below. If these are incorrect, please call your family doctor or clinic.        Dose / Directions Last dose taken    escitalopram 10 MG tablet   Commonly known as:  LEXAPRO   Dose:  10 mg   Quantity:  30 tablet        Take 1 tablet (10 mg) by mouth daily   Refills:  1        hydrOXYzine 25 MG tablet   Commonly known as:  ATARAX   Dose:  25  "mg   Quantity:  30 tablet        Take 1 tablet (25 mg) by mouth every 8 hours as needed for anxiety   Refills:  1                Orders Needing Specimen Collection     None      Pending Results     No orders found from 3/16/2018 to 3/19/2018.            Pending Culture Results     No orders found from 3/16/2018 to 3/19/2018.            Pending Results Instructions     If you had any lab results that were not finalized at the time of your Discharge, you can call the ED Lab Result RN at [phone]. You will be contacted by this team for any positive Lab results or changes in treatment. The nurses are available 7 days a week from 10A to 6:30P.  You can leave a message 24 hours per day and they will return your call.        Test Results From Your Hospital Stay               Thank you for choosing [facility]       Thank you for choosing [facility] for your care. Our goal is always to provide you with excellent care. Hearing back from our patients is one way we can continue to improve our services. Please take a few minutes to complete the written survey that you may receive in the mail after you visit with us. Thank you!        [employer] Information     [employer] lets you send messages to your doctor, view your test results, renew your prescriptions, schedule appointments and more. To sign up, go to [email or web address] . Click on \"Log in\" on the left side of the screen, which will take you to the Welcome page. Then click on \"Sign up Now\" on the right side of the page.     You will be asked to enter the access code listed below, as well as some personal information. Please follow the directions to create your username and password.     Your access code is: [email or web address]  Expires: 2018  5:45 PM     Your access code will  in 90 days. If you need help or a new code, please call your [facility] clinic or [phone].        Care EveryWhere ID     This is your Care EveryWhere ID. This could be used by other " organizations to access your [facility] medical records  [id number]        Equal Access to Services     DAE BAXTER : David Fang, ruthie vargas, eric maya. So [facility] [phone].    ATENCIÓN: Si habla español, tiene a sullivan disposición servicios gratuitos de asistencia lingüística. Llame al [phone].    We comply with applicable federal civil rights laws and [place] laws. We do not discriminate on the basis of race, color, national origin, age, disability, sex, sexual orientation, or gender identity.            After Visit Summary       This is your record. Keep this with you and show to your community pharmacist(s) and doctor(s) at your next visit.

## 2018-03-18 NOTE — DISCHARGE INSTRUCTIONS

## 2018-03-18 NOTE — ED PROVIDER NOTES
Chief Complaint:     Chief Complaint   Patient presents with     Cough     2-3 days       HPI: Travis Carobne is an 27 year old male who presents with a cough.   It began  2 day(s) ago and has unchanged.  Cough is dry There is no shortness of breath, wheezing and chest pain.      Associated symptoms: congestion.    Recent travel?  no.    No abdominal pain, or diarrhea,  No vomiting.  No headache or neck pain.    ROS: Further problem focused system review was otherwise negative.     Respiratory History  no history of pneumonia or bronchitis     Problem history  Patient Active Problem List   Diagnosis     Anxiety        Allergies  Allergies   Allergen Reactions     Sulfa Drugs Other (See Comments)     Redness in eyes from eye sulfa medicine        Smoking History  History   Smoking Status     Never Smoker   Smokeless Tobacco     Never Used        Current Meds  No current facility-administered medications for this encounter.     Current Outpatient Prescriptions:      escitalopram (LEXAPRO) 10 MG tablet, Take 1 tablet (10 mg) by mouth daily, Disp: 30 tablet, Rfl: 1     hydrOXYzine (ATARAX) 25 MG tablet, Take 1 tablet (25 mg) by mouth every 8 hours as needed for anxiety (Patient not taking: Reported on 2/26/2018), Disp: 30 tablet, Rfl: 1        OBJECTIVE     Vital signs reviewed by Eduardo Amaral  /89  Temp 97.9  F (36.6  C) (Oral)  Resp 16  SpO2 97%     PEFR:  General appearance: healthy, alert and no distress  Ears: R TM - normal: no effusions, no erythema, and normal landmarks, L TM - normal: no effusions, no erythema, and normal landmarks  Eyes: R normal, L normal  Nose: normal  Oropharynx: mild erythema  Neck: supple and no adenopathy  Lungs: normal and clear to auscultation  Heart: S1, S2 normal, no murmur, click, rub or gallop, regular rate and rhythm       ASSESSMENT    1. Viral upper respiratory tract infection with cough        PLAN     Rest, Push fluids, vaporizer, elevation of head of  bed.  Ibuprofen and or Tylenol for any fever or body aches.  Over the counter cough suppressant- PRN- as discussed.   If symptoms worsen, recheck immediately otherwise follow up with your PCP PRN.  Patient verbalized understanding and agreed with this plan.      Eduardo Amaral  3/18/2018, 5:36 PM       Eduardo Amaral PA-C  03/18/18 1744

## 2018-03-21 NOTE — PROGRESS NOTES
[facility]  March 5, 2018      Behavioral Health Clinician Progress Note    Patient Name: Travis Carbone           Service Type: Individual      Service Location:  in clinic      Session Start Time: 310 pm  Session End Time: 335 pm      Session Length: 16 - 37      Attendees: Patient    Visit Activities (Refresh list every visit): [facility] Only    Diagnostic Assessment Date: 2-  Treatment Plan Review Date: not completed  See Flowsheets for today's PHQ-9 and ASTRID-7 results  Previous PHQ-9:   PHQ-9 SCORE 2/27/2018 3/5/2018 3/13/2018   Total Score 8 6 1     Previous ASTRID-7:   ASTRID-7 SCORE 2/25/2018 2/27/2018 3/13/2018   Total Score 18 15 6       MARK LEVEL:  MARK Score (Last Two) 2/27/2018   MARK Raw Score 36   Activation Score 75.5   MARK Level 4       DATA  Extended Session (60+ minutes): No  Interactive Complexity: No  Crisis: No    Treatment Objective(s) Addressed in This Session:  Target Behavior(s): disease management/lifestyle changes decrease anxiety and obsessive thoughts    Anxiety: will experience a reduction in anxiety, will develop more effective coping skills to manage anxiety symptoms, will develop healthy cognitive patterns and beliefs and will increase ability to function adaptively    Current Stressors / Issues:  Pt continues to experience anxiety and obsessive thoughts. He has noticed a decrease in intensity. He continue to be medication compliant. He also reported stressors in his life will be decreasing in the next month with employment and living arrangement changes. Discussed and practiced breathing,  relaxation and mindfulness exercises in session. He will practice between now and next session. He will make another appt with this writer as needed.       Progress on Treatment Objective(s) / Homework:  Minimal progress - ACTION (Actively working towards change); Intervened by reinforcing change plan / affirming steps taken    Motivational Interviewing    MI Intervention:  Co-Developed Goal: decrease anxiety and obsessive thoughts, Expressed Empathy/Understanding, Supported Autonomy, Collaboration, Evocation, Open-ended questions, Reflections: simple and complex, Change talk (evoked) and Reframe     Change Talk Expressed by the Patient: Desire to change Ability to change Reasons to change Need to change Committment to change    Provider Response to Change Talk: E - Evoked more info from patient about behavior change, A - Affirmed patient's thoughts, decisions, or attempts at behavior change, R - Reflected patient's change talk and S - Summarized patient's change talk statements      Care Plan review completed: No    Medication Review:  No changes to current psychiatric medication(s)    Medication Compliance:  Yes    Changes in Health Issues:   None reported    Chemical Use Review:   Substance Use: Chemical use reviewed, no active concerns identified      Tobacco Use: No current tobacco use.      Assessment: Current Emotional / Mental Status (status of significant symptoms):  Risk status (Self / Other harm or suicidal ideation)  Patient has had a history of suicidal ideation: ideation only - increase with increased anxiety  Patient denies current fears or concerns for personal safety.  Patient denies current or recent suicidal ideation or behaviors.  Patient denies current or recent homicidal ideation or behaviors.  Patient denies current or recent self injurious behavior or ideation.  Patient denies other safety concerns.  A safety and risk management plan has not been developed at this time, however patient was encouraged to call [place] [phone] should there be a change in any of these risk factors.    Appearance:   Appropriate   Eye Contact:   Good   Psychomotor Behavior: Normal  Restless   Attitude:   Cooperative   Orientation:   All  Speech   Rate / Production: Normal    Volume:  Normal   Mood:    Anxious  Normal  Affect:    Appropriate  Worrisome   Thought Content:  Clear    Thought Form:  Coherent  Logical   Insight:    Good     Diagnoses:  1. ASTRID (generalized anxiety disorder)        Collateral Reports Completed:  Communicated with: PCP as needed    Plan: (Homework, other):  Patient was given information about behavioral services and encouraged to schedule a follow up appointment with the clinic [facility] in 2 weeks.  He was also given deep Breathing Strategies to practice when experiencing anxiety.  CD Recommendations: No indications of CD issues. MOHSEN Haque, [facility]

## 2018-05-01 ENCOUNTER — TELEPHONE (OUTPATIENT)
Dept: FAMILY MEDICINE | Facility: CLINIC | Age: 28
End: 2018-05-01

## 2018-05-01 DIAGNOSIS — F41.9 ANXIETY: ICD-10-CM

## 2018-05-01 RX ORDER — ESCITALOPRAM OXALATE 10 MG/1
10 TABLET ORAL DAILY
Qty: 30 TABLET | Refills: 1 | Status: SHIPPED | OUTPATIENT
Start: 2018-05-01 | End: 2018-07-06

## 2018-05-01 ASSESSMENT — ANXIETY QUESTIONNAIRES
6. BECOMING EASILY ANNOYED OR IRRITABLE: NOT AT ALL
IF YOU CHECKED OFF ANY PROBLEMS ON THIS QUESTIONNAIRE, HOW DIFFICULT HAVE THESE PROBLEMS MADE IT FOR YOU TO DO YOUR WORK, TAKE CARE OF THINGS AT HOME, OR GET ALONG WITH OTHER PEOPLE: NOT DIFFICULT AT ALL
7. FEELING AFRAID AS IF SOMETHING AWFUL MIGHT HAPPEN: NOT AT ALL
1. FEELING NERVOUS, ANXIOUS, OR ON EDGE: NOT AT ALL
5. BEING SO RESTLESS THAT IT IS HARD TO SIT STILL: NOT AT ALL
GAD7 TOTAL SCORE: 0
3. WORRYING TOO MUCH ABOUT DIFFERENT THINGS: NOT AT ALL
2. NOT BEING ABLE TO STOP OR CONTROL WORRYING: NOT AT ALL

## 2018-05-01 ASSESSMENT — PATIENT HEALTH QUESTIONNAIRE - PHQ9: 5. POOR APPETITE OR OVEREATING: NOT AT ALL

## 2018-05-01 NOTE — TELEPHONE ENCOUNTER
Reason for Call:  Other prescription    Detailed comments: pt calling stating he ran out of medication and his new insurance is effective starting today. He is wondering if he can get a refill put in today please. He has already gone without meds yesteray.    Phone Number Patient can be reached at: Home number on file [phone] (home)    Best Time: any    Can we leave a detailed message on this number? YES    Call taken on 5/1/2018 at 1:38 PM by Yolis Cool

## 2018-05-01 NOTE — TELEPHONE ENCOUNTER
Spoke with pt and he is now scheduled for follow-up appointment on 5/7/18.    PHQ-9 score:    PHQ-9 SCORE 5/1/2018   Total Score 0     ASTRID-7 SCORE 2/27/2018 3/13/2018 5/1/2018   Total Score 15 6 0       Refill provided per protocol.    Deann RIGGS RN

## 2018-05-01 NOTE — TELEPHONE ENCOUNTER
Left a message for pt to call clinic to discuss.  Pharmacy?  Pt is due for follow-up appointment in clinic with Zhane toscano OV note of 2/26/18.    Deann RIGGS RN

## 2018-05-02 ASSESSMENT — PATIENT HEALTH QUESTIONNAIRE - PHQ9: SUM OF ALL RESPONSES TO PHQ QUESTIONS 1-9: 0

## 2018-05-02 ASSESSMENT — ANXIETY QUESTIONNAIRES: GAD7 TOTAL SCORE: 0

## 2018-07-06 ENCOUNTER — TELEPHONE (OUTPATIENT)
Dept: FAMILY MEDICINE | Facility: CLINIC | Age: 28
End: 2018-07-06

## 2018-07-06 DIAGNOSIS — F41.9 ANXIETY: ICD-10-CM

## 2018-07-06 RX ORDER — ESCITALOPRAM OXALATE 10 MG/1
TABLET ORAL
Qty: 5 TABLET | Refills: 0 | OUTPATIENT
Start: 2018-07-06

## 2018-07-06 RX ORDER — ESCITALOPRAM OXALATE 10 MG/1
10 TABLET ORAL DAILY
Qty: 5 TABLET | Refills: 0 | Status: SHIPPED | OUTPATIENT
Start: 2018-07-06 | End: 2018-07-09

## 2018-07-09 ENCOUNTER — VIRTUAL VISIT (OUTPATIENT)
Dept: FAMILY MEDICINE | Facility: CLINIC | Age: 28
End: 2018-07-09

## 2018-07-09 DIAGNOSIS — F41.9 ANXIETY: Primary | ICD-10-CM

## 2018-07-09 PROCEDURE — 98966 PH1 ASSMT&MGMT NQHP 5-10: CPT | Performed by: NURSE PRACTITIONER

## 2018-07-09 RX ORDER — ESCITALOPRAM OXALATE 10 MG/1
10 TABLET ORAL DAILY
Qty: 90 TABLET | Refills: 3 | Status: SHIPPED | OUTPATIENT
Start: 2018-07-09 | End: 2019-07-26

## 2018-07-09 ASSESSMENT — ANXIETY QUESTIONNAIRES
3. WORRYING TOO MUCH ABOUT DIFFERENT THINGS: NOT AT ALL
5. BEING SO RESTLESS THAT IT IS HARD TO SIT STILL: NOT AT ALL
GAD7 TOTAL SCORE: 0
1. FEELING NERVOUS, ANXIOUS, OR ON EDGE: NOT AT ALL
2. NOT BEING ABLE TO STOP OR CONTROL WORRYING: NOT AT ALL
7. FEELING AFRAID AS IF SOMETHING AWFUL MIGHT HAPPEN: NOT AT ALL
6. BECOMING EASILY ANNOYED OR IRRITABLE: NOT AT ALL
IF YOU CHECKED OFF ANY PROBLEMS ON THIS QUESTIONNAIRE, HOW DIFFICULT HAVE THESE PROBLEMS MADE IT FOR YOU TO DO YOUR WORK, TAKE CARE OF THINGS AT HOME, OR GET ALONG WITH OTHER PEOPLE: NOT DIFFICULT AT ALL

## 2018-07-09 ASSESSMENT — PATIENT HEALTH QUESTIONNAIRE - PHQ9: 5. POOR APPETITE OR OVEREATING: NOT AT ALL

## 2018-07-09 NOTE — MR AVS SNAPSHOT
After Visit Summary   7/9/2018    Travis Carbone    MRN: [id number]           Patient Information     Date Of Birth          1990        Visit Information        Provider Department      7/9/2018 5:20 PM Zhane Cruz APRN CNP [facility]        Today's Diagnoses     Anxiety    -  1       Follow-ups after your visit        Who to contact     If you have questions or need follow up information about today's clinic visit or your schedule please contact [facility] directly at [phone].  Normal or non-critical lab and imaging results will be communicated to you by MyChart, letter or phone within 4 business days after the clinic has received the results. If you do not hear from us within 7 days, please contact the clinic through MyChart or phone. If you have a critical or abnormal lab result, we will notify you by phone as soon as possible.  Submit refill requests through [employer] or call your pharmacy and they will forward the refill request to us. Please allow 3 business days for your refill to be completed.          Additional Information About Your Visit        Care EveryWhere ID     This is your Care EveryWhere ID. This could be used by other organizations to access your [place] medical records  [id number]         Blood Pressure from Last 3 Encounters:   03/18/18 135/89   02/26/18 136/87   02/25/18 128/70    Weight from Last 3 Encounters:   02/26/18 248 lb (112.5 kg)   02/25/18 242 lb (109.8 kg)   12/16/17 258 lb 6.4 oz (117.2 kg)              Today, you had the following     No orders found for display         Where to get your medicines      These medications were sent to [facility] [address]  [address] [address]     Phone:  [phone]     escitalopram 10 MG tablet          Primary Care Provider Fax #    Physician No Ref-Primary [phone]       No address on file        Equal  Access to Services     [facility] : Hadii aad ku hadjenniferbouchra Franchescasabi, wamadhavda luqadaha, qaybta kadaeric smith. So [facility] [phone].    ATENCIÓN: Si habla español, tiene a sullivan disposición servicios gratuitos de asistencia lingüística. Llame al [phone].    We comply with applicable federal civil rights laws and [place] laws. We do not discriminate on the basis of race, color, national origin, age, disability, sex, sexual orientation, or gender identity.            Thank you!     Thank you for choosing [facility]  for your care. Our goal is always to provide you with excellent care. Hearing back from our patients is one way we can continue to improve our services. Please take a few minutes to complete the written survey that you may receive in the mail after your visit with us. Thank you!             Your Updated Medication List - Protect others around you: Learn how to safely use, store and throw away your medicines at www.disposemymeds.org.          This list is accurate as of 7/9/18  5:34 PM.  Always use your most recent med list.                   Brand Name Dispense Instructions for use Diagnosis    escitalopram 10 MG tablet    LEXAPRO    90 tablet    Take 1 tablet (10 mg) by mouth daily    Anxiety

## 2018-07-09 NOTE — PROGRESS NOTES
Telephone visit:      SUBJECTIVE:   Travis Carbone is a 27 year old male who presents to clinic today for the following health issues:      Anxiety Follow-Up    Status since last visit: Improved     Anxiety is gone.    Able to cope better    Other associated symptoms:None    Complicating factors:   Significant life event: Yes   Current substance abuse: None  Depression symptoms: No  ASTRID-7 SCORE 3/13/2018 5/1/2018 7/9/2018   Total Score 6 0 0         Amount of exercise or physical activity: 3 days per week    Problems taking medications regularly: No    Medication side effects: weight gain?    Diet: regular (no restrictions)          Problem list and histories reviewed & adjusted, as indicated.  Additional history: as documented    Reviewed and updated as needed this visit by clinical staff  Tobacco  Allergies  Meds  Med Hx  Surg Hx  Fam Hx  Soc Hx      Reviewed and updated as needed this visit by Provider         ROS:  Constitutional, HEENT, cardiovascular, pulmonary, gi and gu systems are negative, except as otherwise noted.    OBJECTIVE:     There were no vitals taken for this visit.  There is no height or weight on file to calculate BMI.      ASSESSMENT/PLAN:       ICD-10-CM    1. Anxiety F41.9 escitalopram (LEXAPRO) 10 MG tablet       Much improved.  Continue Lexapro 10 mg daily  Follow up with me as needed.      The risks, benefits and treatment options of prescribed medications or other treatments have been discussed with the patient. The patient verbalized their understanding and should call or follow up if no improvement or if they develop further problems.    Spent 10 minutes on the phone with this patient today during this telephone visit.      ROBIN Kate [facility]

## 2018-07-10 ASSESSMENT — ANXIETY QUESTIONNAIRES: GAD7 TOTAL SCORE: 0

## 2019-07-26 DIAGNOSIS — F41.9 ANXIETY: ICD-10-CM

## 2019-07-26 NOTE — LETTER
[facility]  [address]  [place] [facility] [place] [facility] [place] [address]  Phone: [phone]       July 29, 2019         Travis Carbone  [address] [place] [address]            Dear Travis:    We are concerned about your health care.  We recently provided you with medication refills.  Many medications require routine follow-up with your doctor.    Your prescription for escitalopram has been refilled for 30 days so you may have time for the above noted follow-up. Please call to schedule soon so we can assure you have an appointment before your next refills are needed.  You will need a follow up visit with your provider before we can provide further refills of this medication.    Thank you,      Zhane Cruz MD / SB

## 2019-07-29 RX ORDER — ESCITALOPRAM OXALATE 10 MG/1
TABLET ORAL
Qty: 30 TABLET | Refills: 0 | Status: SHIPPED | OUTPATIENT
Start: 2019-07-29 | End: 2019-09-03

## 2019-07-29 NOTE — TELEPHONE ENCOUNTER
"Requested Prescriptions   Pending Prescriptions Disp Refills     escitalopram (LEXAPRO) 10 MG tablet [Pharmacy Med Name: ESCITALOPRAM OXALATE 10MG TABS] 90 tablet 3     Sig: TAKE ONE TABLET BY MOUTH EVERY DAY  Last Written Prescription Date:  7/9/2018  Last Fill Quantity: 90,  # refills: 3   Last office visit: 7/9/2018 with prescribing provider:  Anthony    Future Office Visit:           SSRIs Protocol Failed - 7/26/2019  5:33 PM  ASTRID-7 SCORE 3/13/2018 5/1/2018 7/9/2018   Total Score 6 0 0     PHQ-9 SCORE 3/5/2018 3/13/2018 5/1/2018   PHQ-9 Total Score 6 1 0           Failed - Recent (12 mo) or future (30 days) visit within the authorizing provider's specialty     Patient had office visit in the last 12 months or has a visit in the next 30 days with authorizing provider or within the authorizing provider's specialty.  See \"Patient Info\" tab in inbasket, or \"Choose Columns\" in Meds & Orders section of the refill encounter.              Passed - Medication is active on med list        Passed - Patient is age 18 or older          "

## 2019-07-29 NOTE — TELEPHONE ENCOUNTER
S:  Refill request for escitalopram    B:  LOV 2/26/18  escitalopram last written 7/9/18 for 12 month supply    A:  Fails [facility] refill protocol d/t no recent (12 month) appt with provider    R:  30 day brianne fill provided   Letter sent informing patient that he will need a follow up visit for further refills.    No further action necessary.  Encounter closed.    Josafat Elias RN

## 2019-09-03 ENCOUNTER — TELEPHONE (OUTPATIENT)
Dept: FAMILY MEDICINE | Facility: CLINIC | Age: 29
End: 2019-09-03

## 2019-09-03 DIAGNOSIS — F41.9 ANXIETY: ICD-10-CM

## 2019-09-03 NOTE — TELEPHONE ENCOUNTER
"Requested Prescriptions   Pending Prescriptions Disp Refills     escitalopram (LEXAPRO) 10 MG tablet [Pharmacy Med Name: ESCITALOPRAM OXALATE 10MG TABS] 30 tablet 0     Sig: TAKE ONE TABLET BY MOUTH ONCE DAILY. (NEED TO BE SEEN IN CLINIC FOR FURTHER REFILLS)   Last Written Prescription Date:  7/29/19  Last Fill Quantity: 30 tab,  # refills: 0   Last office visit: 7/9/2018 with prescribing provider:  XIOMARA Cruz   Future Office Visit:        SSRIs Protocol Failed - 9/3/2019  2:59 PM        Failed - Recent (12 mo) or future (30 days) visit within the authorizing provider's specialty     Patient had office visit in the last 12 months or has a visit in the next 30 days with authorizing provider or within the authorizing provider's specialty.  See \"Patient Info\" tab in inbasket, or \"Choose Columns\" in Meds & Orders section of the refill encounter.              Passed - Medication is active on med list        Passed - Patient is age 18 or older          "

## 2019-09-05 RX ORDER — ESCITALOPRAM OXALATE 10 MG/1
TABLET ORAL
Qty: 30 TABLET | Refills: 0 | Status: SHIPPED | OUTPATIENT
Start: 2019-09-05 | End: 2019-10-15

## 2019-09-05 NOTE — TELEPHONE ENCOUNTER
Helped schedule appointment with patient for 9/13/19 patient asking for a small refill till get till then.  \Felecia Dunlap on 9/5/2019 at 11:51 AM

## 2019-09-05 NOTE — TELEPHONE ENCOUNTER
Medication is being filled for 1 time refill only due to:  needs office visit.     Has pending appt.    30 days sent.    Pt notified.    Christel Angeles RN

## 2019-09-05 NOTE — TELEPHONE ENCOUNTER
Left message for patient to return call to clinic.  He is due for f/u for refill of this medication.  Please help schedule appt.  He received his brianne refill back in July and reminder letter was sent.  Kelly CHRISTIANSON RN

## 2019-10-15 ENCOUNTER — OFFICE VISIT (OUTPATIENT)
Dept: FAMILY MEDICINE | Facility: CLINIC | Age: 29
End: 2019-10-15
Payer: COMMERCIAL

## 2019-10-15 VITALS
DIASTOLIC BLOOD PRESSURE: 70 MMHG | SYSTOLIC BLOOD PRESSURE: 114 MMHG | BODY MASS INDEX: 31.52 KG/M2 | RESPIRATION RATE: 16 BRPM | HEIGHT: 75 IN | HEART RATE: 66 BPM | WEIGHT: 253.5 LBS | TEMPERATURE: 97.9 F

## 2019-10-15 DIAGNOSIS — Z23 NEED FOR PROPHYLACTIC VACCINATION AND INOCULATION AGAINST INFLUENZA: ICD-10-CM

## 2019-10-15 DIAGNOSIS — Z23 NEED FOR VACCINATION: ICD-10-CM

## 2019-10-15 DIAGNOSIS — F41.9 ANXIETY: Primary | ICD-10-CM

## 2019-10-15 PROCEDURE — 90471 IMMUNIZATION ADMIN: CPT | Performed by: FAMILY MEDICINE

## 2019-10-15 PROCEDURE — 99213 OFFICE O/P EST LOW 20 MIN: CPT | Mod: 25 | Performed by: FAMILY MEDICINE

## 2019-10-15 PROCEDURE — 90686 IIV4 VACC NO PRSV 0.5 ML IM: CPT | Performed by: FAMILY MEDICINE

## 2019-10-15 PROCEDURE — 90714 TD VACC NO PRESV 7 YRS+ IM: CPT | Performed by: FAMILY MEDICINE

## 2019-10-15 PROCEDURE — 90472 IMMUNIZATION ADMIN EACH ADD: CPT | Performed by: FAMILY MEDICINE

## 2019-10-15 RX ORDER — ESCITALOPRAM OXALATE 10 MG/1
TABLET ORAL
Qty: 30 TABLET | Refills: 0 | Status: CANCELLED | OUTPATIENT
Start: 2019-10-15

## 2019-10-15 RX ORDER — ESCITALOPRAM OXALATE 10 MG/1
10 TABLET ORAL DAILY
Qty: 90 TABLET | Refills: 3 | Status: SHIPPED | OUTPATIENT
Start: 2019-10-15 | End: 2021-02-12

## 2019-10-15 RX ORDER — ESCITALOPRAM OXALATE 10 MG/1
10 TABLET ORAL DAILY
Qty: 14 TABLET | Refills: 0 | Status: SHIPPED | OUTPATIENT
Start: 2019-10-15 | End: 2019-10-15

## 2019-10-15 RX ORDER — ESCITALOPRAM OXALATE 10 MG/1
10 TABLET ORAL DAILY
Qty: 14 TABLET | Refills: 0 | Status: SHIPPED | OUTPATIENT
Start: 2019-10-15 | End: 2021-02-12

## 2019-10-15 ASSESSMENT — MIFFLIN-ST. JEOR: SCORE: 2200.5

## 2019-10-15 ASSESSMENT — ANXIETY QUESTIONNAIRES
GAD7 TOTAL SCORE: 0
2. NOT BEING ABLE TO STOP OR CONTROL WORRYING: NOT AT ALL
7. FEELING AFRAID AS IF SOMETHING AWFUL MIGHT HAPPEN: NOT AT ALL
1. FEELING NERVOUS, ANXIOUS, OR ON EDGE: NOT AT ALL
6. BECOMING EASILY ANNOYED OR IRRITABLE: NOT AT ALL
5. BEING SO RESTLESS THAT IT IS HARD TO SIT STILL: NOT AT ALL
3. WORRYING TOO MUCH ABOUT DIFFERENT THINGS: NOT AT ALL

## 2019-10-15 ASSESSMENT — PATIENT HEALTH QUESTIONNAIRE - PHQ9
5. POOR APPETITE OR OVEREATING: NOT AT ALL
SUM OF ALL RESPONSES TO PHQ QUESTIONS 1-9: 0

## 2019-10-15 ASSESSMENT — PAIN SCALES - GENERAL: PAINLEVEL: NO PAIN (0)

## 2019-10-15 NOTE — PROGRESS NOTES
"SUBJECTIVE:                                                    Travis Carbone is a 29 year old male who presents to clinic today for the following health issues:    Anxiety ;   Here for refill of medication which is helping  Feb 2018 - started on lexapro 10mg at that time     Helps with stress and mood  Helps with anxiety and depression  No si/hi     Review of systems:  No headache  No tremor     Agrees to flu and tetanus shot today     Problem list and histories reviewed & adjusted, as indicated.  Additional history: as documented     Patient Active Problem List   Diagnosis     Anxiety     Current Outpatient Medications   Medication     escitalopram (LEXAPRO) 10 MG tablet     escitalopram (LEXAPRO) 10 MG tablet     No current facility-administered medications for this visit.         Allergies   Allergen Reactions     Sulfa Drugs Other (See Comments)     Redness in eyes from eye sulfa medicine         OBJECTIVE:                                                    /70 (BP Location: Right arm, Patient Position: Sitting, Cuff Size: Adult Large)   Pulse 66   Temp 97.9  F (36.6  C) (Tympanic)   Resp 16   Ht 1.905 m (6' 3\")   Wt 115 kg (253 lb 8 oz)   BMI 31.69 kg/m   Body mass index is 31.69 kg/m .   GENERAL - Pt is alert and oriented in no acute distress.  Affect is appropriate. Good eye contact.  PSYCH - Pt is clean and well-dressed. Oriented to person,place,and time. Cooperative. No speech abnormalities. No psychomotor agitation or retardation.  Thought process was normal and thought content was free of suicidal/homicidal ideation, obsessions, and delusions. Insight and judgement were good.         ASSESSMENT/PLAN:                                                      (F41.9) Anxiety  (primary encounter diagnosis)  Comment: doing well. Wants to stay on the med. The patient indicates understanding of these issues and agrees with the plan.   Plan: escitalopram (LEXAPRO) 10 MG tablet,         escitalopram " (LEXAPRO) 10 MG tablet,         DISCONTINUED: escitalopram (LEXAPRO) 10 MG         tablet            (Z23) Need for vaccination  Comment:   Plan: TD PRESERV FREE, IM (7+ YRS), EA ADD'L VACCINE            (Z23) Need for prophylactic vaccination and inoculation against influenza  Comment:  Plan: INFLUENZA VACCINE IM > 6 MONTHS VALENT IIV4         [55530], Vaccine Administration, Initial         [08286]            SUIZ Posada MD   [facility]

## 2019-10-16 ASSESSMENT — ANXIETY QUESTIONNAIRES: GAD7 TOTAL SCORE: 0

## 2020-11-29 ENCOUNTER — HEALTH MAINTENANCE LETTER (OUTPATIENT)
Age: 30
End: 2020-11-29

## 2021-01-20 ENCOUNTER — HOSPITAL ENCOUNTER (EMERGENCY)
Facility: CLINIC | Age: 31
Discharge: HOME | End: 2021-01-20
Attending: EMERGENCY MEDICINE | Admitting: EMERGENCY MEDICINE
Payer: COMMERCIAL

## 2021-01-20 VITALS
BODY MASS INDEX: 31.71 KG/M2 | RESPIRATION RATE: 16 BRPM | OXYGEN SATURATION: 97 % | TEMPERATURE: 98.2 F | WEIGHT: 255 LBS | SYSTOLIC BLOOD PRESSURE: 148 MMHG | HEIGHT: 75 IN | DIASTOLIC BLOOD PRESSURE: 95 MMHG | HEART RATE: 78 BPM

## 2021-01-20 DIAGNOSIS — L50.9 URTICARIA: ICD-10-CM

## 2021-01-20 PROCEDURE — 250N000013 HC RX MED GY IP 250 OP 250 PS 637: Performed by: EMERGENCY MEDICINE

## 2021-01-20 PROCEDURE — 99283 EMERGENCY DEPT VISIT LOW MDM: CPT | Performed by: EMERGENCY MEDICINE

## 2021-01-20 PROCEDURE — 99284 EMERGENCY DEPT VISIT MOD MDM: CPT | Performed by: EMERGENCY MEDICINE

## 2021-01-20 PROCEDURE — 250N000012 HC RX MED GY IP 250 OP 636 PS 637: Performed by: EMERGENCY MEDICINE

## 2021-01-20 RX ORDER — DIPHENHYDRAMINE HCL 25 MG
50 CAPSULE ORAL ONCE
Status: COMPLETED | OUTPATIENT
Start: 2021-01-20 | End: 2021-01-20

## 2021-01-20 RX ADMIN — DIPHENHYDRAMINE HYDROCHLORIDE 50 MG: 25 CAPSULE ORAL at 02:30

## 2021-01-20 RX ADMIN — DEXAMETHASONE 10 MG: 2 TABLET ORAL at 02:30

## 2021-01-20 ASSESSMENT — ENCOUNTER SYMPTOMS
CHILLS: 0
TROUBLE SWALLOWING: 0
LIGHT-HEADEDNESS: 0
FEVER: 0
COUGH: 0
APPETITE CHANGE: 0
BACK PAIN: 0
SORE THROAT: 0
FATIGUE: 0
SHORTNESS OF BREATH: 0
CHEST TIGHTNESS: 0
HEADACHES: 0
ABDOMINAL PAIN: 0
NAUSEA: 0
VOICE CHANGE: 0
VOMITING: 0

## 2021-01-20 ASSESSMENT — MIFFLIN-ST. JEOR: SCORE: 2202.3

## 2021-01-20 NOTE — ED TRIAGE NOTES
Pt states he woke up itching his legs about 1 hour PTA. Pt noted hives to his legs. Pt denies SOB and difficulty swallowing. Pt denies known fever. Denies changes in medications, laundry detergent and soaps.

## 2021-01-20 NOTE — ED PROVIDER NOTES
History     Chief Complaint   Patient presents with     Hives     noted to legs 1 hour PTA.     HPI  Travis Carbone is a 30 year old male with no significant contributing past medical history presenting for evaluation of pruritus and urticaria.  Patient reports symptoms began around 12:30 AM.  Patient reports some variable itching on his legs as well as his back and arms.  Symptoms most predominant on the left side of his body.  Denies any chest pain or difficulty breathing.  No nausea or vomiting.  No sensation of swelling in the mouth or difficulty swallowing or breathing no new medications.  No new foods.  No new soaps or other skin products other than patient did take a bath with an Epson salt solution tonight to help him relax.  This was around 10 PM.  Symptoms did not come up until about 2 or so hours later.  No medications taken before arrival.  No history of allergies to anything other than sulfa drugs    Allergies:  Allergies   Allergen Reactions     Sulfa Drugs Other (See Comments)     Redness in eyes from eye sulfa medicine       Problem List:    Patient Active Problem List    Diagnosis Date Noted     Anxiety 03/06/2018     Priority: Medium        Past Medical History:    No past medical history on file.    Past Surgical History:    No past surgical history on file.    Family History:    Family History   Problem Relation Age of Onset     Breast Cancer Mother        Social History:  Marital Status:   [2]  Social History     Tobacco Use     Smoking status: Never Smoker     Smokeless tobacco: Never Used   Substance Use Topics     Alcohol use: No     Drug use: No        Medications:         escitalopram (LEXAPRO) 10 MG tablet       escitalopram (LEXAPRO) 10 MG tablet          Review of Systems   Constitutional: Negative for appetite change, chills, fatigue and fever.   HENT: Negative for congestion, sore throat, trouble swallowing and voice change.    Respiratory: Negative for cough, chest  "tightness and shortness of breath.    Gastrointestinal: Negative for abdominal pain, nausea and vomiting.   Genitourinary: Negative for decreased urine volume.   Musculoskeletal: Negative for back pain.   Skin: Positive for rash (Migrating urticarial rash on his legs, back, arms, and torso.  Rash predominantly on the left side of his body).   Neurological: Negative for light-headedness and headaches.   All other systems reviewed and are negative.      Physical Exam   BP: (!) 161/86  Pulse: 71  Temp: 96.7  F (35.9  C)  Resp: 16  Height: 190.5 cm (6' 3\")  Weight: 115.7 kg (255 lb)  SpO2: 95 %      Physical Exam  Vitals signs and nursing note reviewed.   Constitutional:       Appearance: He is obese. He is not ill-appearing or diaphoretic.   HENT:      Head: Normocephalic and atraumatic.      Nose: Nose normal.   Eyes:      Conjunctiva/sclera: Conjunctivae normal.   Neck:      Musculoskeletal: Normal range of motion.   Cardiovascular:      Rate and Rhythm: Normal rate and regular rhythm.      Pulses: Normal pulses.   Pulmonary:      Effort: Pulmonary effort is normal.      Breath sounds: Normal breath sounds. No wheezing or rales.   Abdominal:      Palpations: Abdomen is soft.      Tenderness: There is no abdominal tenderness.   Musculoskeletal: Normal range of motion.   Skin:     General: Skin is warm and dry.      Capillary Refill: Capillary refill takes less than 2 seconds.      Findings: Rash (Variable areas of patchy erythema with small urticarial welts.  Areas are notably pruritic.) present.      Comments: Pruritic rash present currently around his left ankle, left wrist, and left mid thorax   Neurological:      Mental Status: He is alert and oriented to person, place, and time.   Psychiatric:         Mood and Affect: Mood normal.         ED Course        Procedures               No results found for this or any previous visit (from the past 24 hour(s)).    Medications   diphenhydrAMINE (BENADRYL) capsule 50 mg " (50 mg Oral Given 1/20/21 0230)   dexamethasone (DECADRON) tablet 10 mg (10 mg Oral Given 1/20/21 0230)       Assessments & Plan (with Medical Decision Making)  30-year-old male presenting for evaluation of urticaria with pruritus.  Symptoms began a few hours after having an Epson salt bath.  Has never been exposed to Epson salts before that he recalls.  Experiencing only intermittent migrating pruritus with urticaria.  No other systemic symptoms such as difficulty breathing, swallowing, throat swelling, or chest tightness.  No nausea or GI symptoms.  Symptoms suggestive of localized urticaria.  Possible allergic reaction.  Treated with Benadryl and dexamethasone.  Recommended close primary care follow-up to discuss consideration for allergy testing.  Given the possible trigger with exposure to Epson salts, reasonable to avoid this in the future.     I have reviewed the nursing notes.    I have reviewed the findings, diagnosis, plan and need for follow up with the patient.       New Prescriptions    No medications on file       Final diagnoses:   Urticaria       1/20/2021   [facility] EMERGENCY DEPT     Daigle, Milton White MD  01/20/21 [id number]

## 2021-02-12 ENCOUNTER — VIRTUAL VISIT (OUTPATIENT)
Dept: FAMILY MEDICINE | Facility: CLINIC | Age: 31
End: 2021-02-12
Payer: COMMERCIAL

## 2021-02-12 DIAGNOSIS — F41.9 ANXIETY: ICD-10-CM

## 2021-02-12 PROCEDURE — 99213 OFFICE O/P EST LOW 20 MIN: CPT | Mod: TEL | Performed by: FAMILY MEDICINE

## 2021-02-12 PROCEDURE — 96127 BRIEF EMOTIONAL/BEHAV ASSMT: CPT | Performed by: FAMILY MEDICINE

## 2021-02-12 ASSESSMENT — ANXIETY QUESTIONNAIRES
3. WORRYING TOO MUCH ABOUT DIFFERENT THINGS: SEVERAL DAYS
7. FEELING AFRAID AS IF SOMETHING AWFUL MIGHT HAPPEN: NOT AT ALL
2. NOT BEING ABLE TO STOP OR CONTROL WORRYING: NOT AT ALL
5. BEING SO RESTLESS THAT IT IS HARD TO SIT STILL: NOT AT ALL
6. BECOMING EASILY ANNOYED OR IRRITABLE: NOT AT ALL
1. FEELING NERVOUS, ANXIOUS, OR ON EDGE: SEVERAL DAYS
GAD7 TOTAL SCORE: 2

## 2021-02-12 ASSESSMENT — PATIENT HEALTH QUESTIONNAIRE - PHQ9
SUM OF ALL RESPONSES TO PHQ QUESTIONS 1-9: 0
5. POOR APPETITE OR OVEREATING: NOT AT ALL

## 2021-02-12 NOTE — PROGRESS NOTES
Travis is a 30 year old who is being evaluated via a billable telephone visit.      What phone number would you like to be contacted at? [phone]  How would you like to obtain your AVS? Not needed     Assessment & Plan     Anxiety  Doing well. Med is working for him. Needs refill. The patient indicates understanding of these issues and agrees with the plan.   - escitalopram (LEXAPRO) 10 MG tablet; Take 1 tablet (10 mg) by mouth daily               Regular exercise    Return in about 1 year (around 2/12/2022) for Routine Visit, Depression/Anxiety follow-up.    Mitra Blevins MD  [facility]   Travis is a 30 year old who presents for the following health issues: anxiety     On lexapro 10 mg/day x 3 years  Feels that it is working well     No side effects     Had covid -  10/22/2021 - still can't smell     PHQ-9 ([employer]) 2/12/2021   1.  Little interest or pleasure in doing things 0   2.  Feeling down, depressed, or hopeless 0   3.  Trouble falling or staying asleep, or sleeping too much 0   4.  Feeling tired or having little energy 0   5.  Poor appetite or overeating 0   6.  Feeling bad about yourself 0   7.  Trouble concentrating 0   8.  Moving slowly or restless 0   9.  Suicidal or self-harm thoughts 0   PHQ-9 Total Score 0   Difficulty at work, home, or with people    ASTRID-7   [employer], 2002; Used with Permission) 2/12/2021   1. Feeling nervous, anxious, or on edge 1   2. Not being able to stop or control worrying 0   3. Worrying too much about different things 1   4. Trouble relaxing 0   5. Being so restless that it is hard to sit still 0   6. Becoming easily annoyed or irritable 0   7. Feeling afraid, as if something awful might happen 0   ASTRID-7 Total Score 2   If you checked any problems, how difficult have they made it for you to do your work, take care of things at home, or get along with other people?          Review of Systems         Objective           Vitals:  No  vitals were obtained today due to virtual visit.    Physical Exam   healthy, alert and no distress  PSYCH: Alert and oriented times 3; coherent speech, normal   rate and volume, able to articulate logical thoughts, able   to abstract reason, no tangential thoughts, no hallucinations   or delusions  His affect is normal  RESP: No cough, no audible wheezing, able to talk in full sentences  Remainder of exam unable to be completed due to telephone visits                Phone call duration: 6 minutes

## 2021-02-13 RX ORDER — ESCITALOPRAM OXALATE 10 MG/1
10 TABLET ORAL DAILY
Qty: 90 TABLET | Refills: 3 | Status: SHIPPED | OUTPATIENT
Start: 2021-02-13 | End: 2022-04-06

## 2021-02-13 ASSESSMENT — ANXIETY QUESTIONNAIRES: GAD7 TOTAL SCORE: 2

## 2021-04-09 ENCOUNTER — NURSE TRIAGE (OUTPATIENT)
Dept: NURSING | Facility: CLINIC | Age: 31
End: 2021-04-09
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
## 2021-09-19 ENCOUNTER — HEALTH MAINTENANCE LETTER (OUTPATIENT)
Age: 31
End: 2021-09-19

## 2022-01-09 ENCOUNTER — HEALTH MAINTENANCE LETTER (OUTPATIENT)
Age: 32
End: 2022-01-09

## 2022-08-08 VITALS
HEART RATE: 81 BPM | OXYGEN SATURATION: 97 % | HEIGHT: 75 IN | SYSTOLIC BLOOD PRESSURE: 166 MMHG | TEMPERATURE: 97.8 F | DIASTOLIC BLOOD PRESSURE: 99 MMHG | WEIGHT: 275 LBS | BODY MASS INDEX: 34.19 KG/M2 | RESPIRATION RATE: 18 BRPM

## 2022-08-09 ENCOUNTER — HOSPITAL ENCOUNTER (EMERGENCY)
Facility: HOSPITAL | Age: 32
Discharge: LEFT BEFORE DISCHARGE | End: 2022-08-09
Payer: COMMERCIAL

## 2022-08-09 NOTE — ED TRIAGE NOTES
Pt presents with complaints of shortness of breath and appears to be a bit anxious.  Pt states he feels tired and is feeling like it is hard to catch his air.  Pt was watching oxygen saturation at home and it was fluctuating from 98% to 92%     Triage Assessment     Row Name 08/08/22 0847       Triage Assessment (Adult)    Airway WDL WDL       Respiratory WDL    Respiratory WDL WDL       Skin Circulation/Temperature WDL    Skin Circulation/Temperature WDL WDL       Cardiac WDL    Cardiac WDL WDL       Peripheral/Neurovascular WDL    Peripheral Neurovascular WDL WDL       Cognitive/Neuro/Behavioral WDL    Cognitive/Neuro/Behavioral WDL WDL       [place] Coma Scale    Best Eye Response 4-->(E4) spontaneous    Best Motor Response 6-->(M6) obeys commands    Best Verbal Response 5-->(V5) oriented    [place] Coma Scale Score 15

## 2022-11-21 ENCOUNTER — HEALTH MAINTENANCE LETTER (OUTPATIENT)
Age: 32
End: 2022-11-21

## 2023-01-27 ENCOUNTER — ANCILLARY PROCEDURE (OUTPATIENT)
Dept: GENERAL RADIOLOGY | Facility: CLINIC | Age: 33
End: 2023-01-27
Attending: PHYSICIAN ASSISTANT
Payer: COMMERCIAL

## 2023-01-27 ENCOUNTER — OFFICE VISIT (OUTPATIENT)
Dept: FAMILY MEDICINE | Facility: CLINIC | Age: 33
End: 2023-01-27
Payer: COMMERCIAL

## 2023-01-27 VITALS
DIASTOLIC BLOOD PRESSURE: 78 MMHG | RESPIRATION RATE: 15 BRPM | BODY MASS INDEX: 34.96 KG/M2 | WEIGHT: 281.2 LBS | TEMPERATURE: 98.5 F | HEART RATE: 90 BPM | OXYGEN SATURATION: 98 % | HEIGHT: 75 IN | SYSTOLIC BLOOD PRESSURE: 127 MMHG

## 2023-01-27 DIAGNOSIS — M54.14 THORACIC RADICULOPATHY: ICD-10-CM

## 2023-01-27 DIAGNOSIS — R20.2 PARESTHESIAS: ICD-10-CM

## 2023-01-27 DIAGNOSIS — R79.89 ELEVATED LFTS: ICD-10-CM

## 2023-01-27 DIAGNOSIS — M54.6 ACUTE MIDLINE THORACIC BACK PAIN: ICD-10-CM

## 2023-01-27 DIAGNOSIS — R10.11 RUQ ABDOMINAL PAIN: Primary | ICD-10-CM

## 2023-01-27 LAB
ALBUMIN SERPL BCG-MCNC: 4.5 G/DL (ref 3.5–5.2)
ALP SERPL-CCNC: 88 U/L (ref 40–129)
ALT SERPL W P-5'-P-CCNC: 157 U/L (ref 10–50)
ANION GAP SERPL CALCULATED.3IONS-SCNC: 10 MMOL/L (ref 7–15)
AST SERPL W P-5'-P-CCNC: 69 U/L (ref 10–50)
BILIRUB SERPL-MCNC: 0.6 MG/DL
BUN SERPL-MCNC: 12 MG/DL (ref 6–20)
CALCIUM SERPL-MCNC: 9.3 MG/DL (ref 8.6–10)
CHLORIDE SERPL-SCNC: 104 MMOL/L (ref 98–107)
CREAT SERPL-MCNC: 1.05 MG/DL (ref 0.67–1.17)
DEPRECATED HCO3 PLAS-SCNC: 27 MMOL/L (ref 22–29)
GFR SERPL CREATININE-BSD FRML MDRD: >90 ML/MIN/1.73M2
GLUCOSE SERPL-MCNC: 100 MG/DL (ref 70–99)
POTASSIUM SERPL-SCNC: 3.9 MMOL/L (ref 3.4–5.3)
PROT SERPL-MCNC: 7.9 G/DL (ref 6.4–8.3)
SODIUM SERPL-SCNC: 141 MMOL/L (ref 136–145)

## 2023-01-27 PROCEDURE — 80053 COMPREHEN METABOLIC PANEL: CPT | Performed by: PHYSICIAN ASSISTANT

## 2023-01-27 PROCEDURE — 36415 COLL VENOUS BLD VENIPUNCTURE: CPT | Performed by: PHYSICIAN ASSISTANT

## 2023-01-27 PROCEDURE — 99213 OFFICE O/P EST LOW 20 MIN: CPT | Performed by: PHYSICIAN ASSISTANT

## 2023-01-27 PROCEDURE — 72070 X-RAY EXAM THORAC SPINE 2VWS: CPT | Mod: TC | Performed by: RADIOLOGY
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
## 2023-01-27 NOTE — PATIENT INSTRUCTIONS
Ultrasound: Please contact [facility]  at [phone] to schedule appointment

## 2023-03-31 ENCOUNTER — HOSPITAL ENCOUNTER (OUTPATIENT)
Dept: ULTRASOUND IMAGING | Facility: CLINIC | Age: 33
Discharge: HOME | End: 2023-03-31
Attending: PHYSICIAN ASSISTANT | Admitting: PHYSICIAN ASSISTANT
Payer: COMMERCIAL

## 2023-03-31 DIAGNOSIS — R10.11 RUQ ABDOMINAL PAIN: ICD-10-CM

## 2023-03-31 PROCEDURE — 76705 ECHO EXAM OF ABDOMEN: CPT

## 2023-07-27 ENCOUNTER — HOSPITAL ENCOUNTER (EMERGENCY)
Facility: CLINIC | Age: 33
Discharge: HOME | End: 2023-07-27
Attending: PHYSICIAN ASSISTANT | Admitting: PHYSICIAN ASSISTANT
Payer: COMMERCIAL

## 2023-07-27 VITALS
OXYGEN SATURATION: 98 % | TEMPERATURE: 98.2 F | SYSTOLIC BLOOD PRESSURE: 147 MMHG | DIASTOLIC BLOOD PRESSURE: 91 MMHG | HEART RATE: 66 BPM | RESPIRATION RATE: 16 BRPM

## 2023-07-27 DIAGNOSIS — J02.9 ACUTE PHARYNGITIS: ICD-10-CM

## 2023-07-27 LAB — GROUP A STREP BY PCR: NOT DETECTED

## 2023-07-27 PROCEDURE — G0463 HOSPITAL OUTPT CLINIC VISIT: HCPCS | Performed by: PHYSICIAN ASSISTANT

## 2023-07-27 PROCEDURE — 87651 STREP A DNA AMP PROBE: CPT | Performed by: PHYSICIAN ASSISTANT

## 2023-07-27 PROCEDURE — 99213 OFFICE O/P EST LOW 20 MIN: CPT | Performed by: PHYSICIAN ASSISTANT

## 2023-07-27 ASSESSMENT — ENCOUNTER SYMPTOMS
SORE THROAT: 1
CONSTITUTIONAL NEGATIVE: 1

## 2023-07-28 NOTE — ED PROVIDER NOTES
History     Chief Complaint   Patient presents with    Pharyngitis     X 4 days. Lymph nodes swollen.      HPI  Travis Carbone Jr. is a 33 year old male who presents to Urgent Care with complaints of sore throat for the past 4 days.  Patient states he had fevers at the onset of his illness but none since.  Lymph nodes are also swollen.  Denies cough, rash, neck pain/stiffness, sinus pressure, nasal congestion, nausea, vomiting, diarrhea, abdominal pain, chest pain, or shortness of breath.  No known ill contacts.      Allergies:  Allergies   Allergen Reactions    Sulfa Antibiotics Other (See Comments)     Redness in eyes from eye sulfa medicine       Problem List:    Patient Active Problem List    Diagnosis Date Noted    Anxiety 03/06/2018     Priority: Medium        Past Medical History:    No past medical history on file.    Past Surgical History:    No past surgical history on file.    Family History:    Family History   Problem Relation Age of Onset    Breast Cancer Mother        Social History:  Marital Status:   [2]  Social History     Tobacco Use    Smoking status: Never    Smokeless tobacco: Never   Substance Use Topics    Alcohol use: No    Drug use: No        Medications:    escitalopram (LEXAPRO) 10 MG tablet          Review of Systems   Constitutional: Negative.    HENT:  Positive for sore throat.    All other systems reviewed and are negative.      Physical Exam   BP: (!) 147/91  Pulse: 66  Temp: 98.2  F (36.8  C)  Resp: 16  SpO2: 98 %      Physical Exam  Constitutional:       General: He is not in acute distress.     Appearance: Normal appearance. He is well-developed. He is not ill-appearing, toxic-appearing or diaphoretic.   HENT:      Head: Normocephalic and atraumatic.      Right Ear: Tympanic membrane, ear canal and external ear normal.      Left Ear: Tympanic membrane, ear canal and external ear normal.      Nose: Nose normal. No mucosal edema, congestion or rhinorrhea.       Mouth/Throat:      Lips: Pink.      Mouth: Mucous membranes are moist.      Pharynx: Uvula midline. Posterior oropharyngeal erythema present. No pharyngeal swelling, oropharyngeal exudate or uvula swelling.      Tonsils: No tonsillar exudate or tonsillar abscesses. 1+ on the right. 1+ on the left.   Eyes:      Extraocular Movements: Extraocular movements intact.      Conjunctiva/sclera: Conjunctivae normal.      Pupils: Pupils are equal, round, and reactive to light.   Cardiovascular:      Rate and Rhythm: Normal rate and regular rhythm.      Heart sounds: Normal heart sounds.   Pulmonary:      Effort: Pulmonary effort is normal. No respiratory distress.      Breath sounds: Normal breath sounds. No wheezing or rales.   Musculoskeletal:      Cervical back: Normal range of motion and neck supple. No rigidity or tenderness. Normal range of motion.   Lymphadenopathy:      Cervical: No cervical adenopathy.   Skin:     General: Skin is warm and dry.   Neurological:      Mental Status: He is alert and oriented to person, place, and time.   Psychiatric:         Behavior: Behavior is cooperative.         ED Course                 Procedures      Results for orders placed or performed during the hospital encounter of 07/27/23 (from the past 24 hour(s))   Group A Streptococcus PCR Throat Swab    Specimen: Throat; Swab   Result Value Ref Range    Group A strep by PCR Not Detected Not Detected    Narrative    The Xpert Xpress Strep A test, performed on the [employer], is a rapid, qualitative in vitro diagnostic test for the detection of Streptococcus pyogenes (Group A ß-hemolytic Streptococcus, Strep A) in throat swab specimens from patients with signs and symptoms of pharyngitis. The Xpert Xpress Strep A test can be used as an aid in the diagnosis of Group A Streptococcal pharyngitis. The assay is not intended to monitor treatment for Group A Streptococcus infections. The Xpert Xpress Strep A test utilizes an  automated real-time polymerase chain reaction (PCR) to detect Streptococcus pyogenes DNA.       Medications - No data to display    Assessments & Plan (with Medical Decision Making)     Pt is a 33 year old male who presents to Urgent Care with complaints of sore throat for the past 4 days.  Patient states he had fevers at the onset of his illness but none since.     Pt is afebrile on arrival.  Exam as above.  Strep testing was negative.  Discussed results with patient.  Encouraged symptomatic treatments at home.  Return precautions were reviewed.  Hand-outs were provided.    Patient was instructed to follow-up with PCP for continued care and management.  He is to return to the ED for persistent and/or worsening symptoms.  Patient expressed understanding of the diagnosis and plan and was discharged home in good condition.    I have reviewed the nursing notes.    I have reviewed the findings, diagnosis, plan and need for follow up with the patient.      New Prescriptions    No medications on file       Final diagnoses:   Acute pharyngitis       7/27/2023   [facility] EMERGENCY DEPT      Disclaimer:  This note consists of symbols derived from keyboarding, dictation and/or voice recognition software.  As a result, there may be errors in the script that have gone undetected.  Please consider this when interpreting information found in this chart.       Angelica Rodriguez PA-C  07/27/23 2000

## 2023-08-17 NOTE — ED NOTES
Called out for Pt multiple times in triage. No answer. Pt dismissed from Epic   I reviewed the H&P, I examined the patient, and there are no changes in the patient's condition.

## 2024-01-27 ENCOUNTER — HOSPITAL ENCOUNTER (EMERGENCY)
Facility: CLINIC | Age: 34
Discharge: HOME | End: 2024-01-27
Attending: EMERGENCY MEDICINE | Admitting: EMERGENCY MEDICINE
Payer: COMMERCIAL

## 2024-01-27 ENCOUNTER — APPOINTMENT (OUTPATIENT)
Dept: GENERAL RADIOLOGY | Facility: CLINIC | Age: 34
End: 2024-01-27
Attending: EMERGENCY MEDICINE
Payer: COMMERCIAL

## 2024-01-27 VITALS
WEIGHT: 255 LBS | RESPIRATION RATE: 16 BRPM | DIASTOLIC BLOOD PRESSURE: 76 MMHG | OXYGEN SATURATION: 99 % | TEMPERATURE: 98.2 F | HEART RATE: 84 BPM | HEIGHT: 75 IN | BODY MASS INDEX: 31.71 KG/M2 | SYSTOLIC BLOOD PRESSURE: 120 MMHG

## 2024-01-27 DIAGNOSIS — J10.1 INFLUENZA A: ICD-10-CM

## 2024-01-27 DIAGNOSIS — J02.0 ACUTE STREPTOCOCCAL PHARYNGITIS: ICD-10-CM

## 2024-01-27 LAB
ALBUMIN SERPL BCG-MCNC: 4.3 G/DL (ref 3.5–5.2)
ALP SERPL-CCNC: 72 U/L (ref 40–150)
ALT SERPL W P-5'-P-CCNC: 67 U/L (ref 0–70)
ANION GAP SERPL CALCULATED.3IONS-SCNC: 14 MMOL/L (ref 7–15)
AST SERPL W P-5'-P-CCNC: 59 U/L (ref 0–45)
BASOPHILS # BLD AUTO: 0.1 10E3/UL (ref 0–0.2)
BASOPHILS NFR BLD AUTO: 1 %
BILIRUB SERPL-MCNC: 0.4 MG/DL
BUN SERPL-MCNC: 13.2 MG/DL (ref 6–20)
CALCIUM SERPL-MCNC: 8.9 MG/DL (ref 8.6–10)
CHLORIDE SERPL-SCNC: 100 MMOL/L (ref 98–107)
CREAT SERPL-MCNC: 0.99 MG/DL (ref 0.67–1.17)
DEPRECATED HCO3 PLAS-SCNC: 24 MMOL/L (ref 22–29)
EGFRCR SERPLBLD CKD-EPI 2021: >90 ML/MIN/1.73M2
EOSINOPHIL # BLD AUTO: 0.1 10E3/UL (ref 0–0.7)
EOSINOPHIL NFR BLD AUTO: 1 %
ERYTHROCYTE [DISTWIDTH] IN BLOOD BY AUTOMATED COUNT: 12.4 % (ref 10–15)
FLUAV RNA SPEC QL NAA+PROBE: POSITIVE
FLUBV RNA RESP QL NAA+PROBE: NEGATIVE
GLUCOSE SERPL-MCNC: 114 MG/DL (ref 70–99)
GROUP A STREP BY PCR: DETECTED
HCT VFR BLD AUTO: 42.6 % (ref 40–53)
HGB BLD-MCNC: 14.6 G/DL (ref 13.3–17.7)
HOLD SPECIMEN: NORMAL
HOLD SPECIMEN: NORMAL
IMM GRANULOCYTES # BLD: 0 10E3/UL
IMM GRANULOCYTES NFR BLD: 0 %
LYMPHOCYTES # BLD AUTO: 1.9 10E3/UL (ref 0.8–5.3)
LYMPHOCYTES NFR BLD AUTO: 19 %
MCH RBC QN AUTO: 30 PG (ref 26.5–33)
MCHC RBC AUTO-ENTMCNC: 34.3 G/DL (ref 31.5–36.5)
MCV RBC AUTO: 88 FL (ref 78–100)
MONOCYTES # BLD AUTO: 1 10E3/UL (ref 0–1.3)
MONOCYTES NFR BLD AUTO: 10 %
NEUTROPHILS # BLD AUTO: 7 10E3/UL (ref 1.6–8.3)
NEUTROPHILS NFR BLD AUTO: 69 %
NRBC # BLD AUTO: 0 10E3/UL
NRBC BLD AUTO-RTO: 0 /100
PLATELET # BLD AUTO: 181 10E3/UL (ref 150–450)
POTASSIUM SERPL-SCNC: 3.7 MMOL/L (ref 3.4–5.3)
PROT SERPL-MCNC: 7.6 G/DL (ref 6.4–8.3)
RBC # BLD AUTO: 4.87 10E6/UL (ref 4.4–5.9)
RSV RNA SPEC NAA+PROBE: NEGATIVE
SARS-COV-2 RNA RESP QL NAA+PROBE: NEGATIVE
SODIUM SERPL-SCNC: 138 MMOL/L (ref 135–145)
WBC # BLD AUTO: 10.1 10E3/UL (ref 4–11)

## 2024-01-27 PROCEDURE — 36415 COLL VENOUS BLD VENIPUNCTURE: CPT | Performed by: EMERGENCY MEDICINE

## 2024-01-27 PROCEDURE — 258N000003 HC RX IP 258 OP 636: Performed by: EMERGENCY MEDICINE

## 2024-01-27 PROCEDURE — 87637 SARSCOV2&INF A&B&RSV AMP PRB: CPT | Performed by: EMERGENCY MEDICINE

## 2024-01-27 PROCEDURE — 250N000009 HC RX 250: Performed by: EMERGENCY MEDICINE

## 2024-01-27 PROCEDURE — 96360 HYDRATION IV INFUSION INIT: CPT

## 2024-01-27 PROCEDURE — 85025 COMPLETE CBC W/AUTO DIFF WBC: CPT | Performed by: EMERGENCY MEDICINE

## 2024-01-27 PROCEDURE — 71046 X-RAY EXAM CHEST 2 VIEWS: CPT

## 2024-01-27 PROCEDURE — 99284 EMERGENCY DEPT VISIT MOD MDM: CPT | Performed by: EMERGENCY MEDICINE

## 2024-01-27 PROCEDURE — 80053 COMPREHEN METABOLIC PANEL: CPT | Performed by: EMERGENCY MEDICINE

## 2024-01-27 PROCEDURE — 99284 EMERGENCY DEPT VISIT MOD MDM: CPT | Mod: 25

## 2024-01-27 PROCEDURE — 87651 STREP A DNA AMP PROBE: CPT | Performed by: EMERGENCY MEDICINE

## 2024-01-27 RX ORDER — CODEINE PHOSPHATE AND GUAIFENESIN 10; 100 MG/5ML; MG/5ML
1-2 SOLUTION ORAL EVERY 4 HOURS PRN
Qty: 120 ML | Refills: 0 | Status: SHIPPED | OUTPATIENT
Start: 2024-01-27 | End: 2024-05-06

## 2024-01-27 RX ORDER — DEXAMETHASONE SODIUM PHOSPHATE 4 MG/ML
10 VIAL (ML) INJECTION ONCE
Status: COMPLETED | OUTPATIENT
Start: 2024-01-27 | End: 2024-01-27

## 2024-01-27 RX ORDER — PENICILLIN V POTASSIUM 500 MG/1
500 TABLET, FILM COATED ORAL 3 TIMES DAILY
Qty: 30 TABLET | Refills: 0 | Status: SHIPPED | OUTPATIENT
Start: 2024-01-27 | End: 2024-02-06

## 2024-01-27 RX ADMIN — SODIUM CHLORIDE 1000 ML: 9 INJECTION, SOLUTION INTRAVENOUS at 06:05

## 2024-01-27 RX ADMIN — DEXAMETHASONE SODIUM PHOSPHATE 10 MG: 4 INJECTION, SOLUTION INTRAMUSCULAR; INTRAVENOUS at 07:22

## 2024-01-27 ASSESSMENT — ACTIVITIES OF DAILY LIVING (ADL): ADLS_ACUITY_SCORE: 35

## 2024-01-27 NOTE — ED PROVIDER NOTES
"  History     Chief Complaint   Patient presents with    Cough    Fever     HPI  As mentioned in the HPI, in addition focused review of systems was negative.    Travis Carbone Jr. is a 33 year old male who presents emergency department for fever, nonproductive cough, sore throat, myalgias, fatigue and malaise.  Onset of illness 2 days ago.  No drooling, voice change or respiratory difficulty.  Cough is making it difficult for him to sleep.  Daughter and his wife have recently been ill with URI illness.  No recent travel.  No other pertinent history or acute complaints or concerns.      Allergies:  Allergies   Allergen Reactions    Sulfa Antibiotics Other (See Comments)     Redness in eyes from eye sulfa medicine       Problem List:    Patient Active Problem List    Diagnosis Date Noted    Anxiety 03/06/2018     Priority: Medium        Past Medical History:    History reviewed. No pertinent past medical history.    Past Surgical History:    History reviewed. No pertinent surgical history.    Family History:    Family History   Problem Relation Age of Onset    Breast Cancer Mother        Social History:  Marital Status:   [2]  Social History     Tobacco Use    Smoking status: Never    Smokeless tobacco: Never   Substance Use Topics    Alcohol use: No    Drug use: No        Medications:    guaiFENesin-codeine (ROBITUSSIN AC) 100-10 MG/5ML solution  penicillin V (VEETID) 500 MG tablet  escitalopram (LEXAPRO) 10 MG tablet          Review of Systems  As mentioned in the HPI, in addition focused review of systems was negative.    Physical Exam   BP: (!) 156/87  Pulse: 94  Temp: 98.2  F (36.8  C)  Resp: 16  Height: 190.5 cm (6' 3\")  Weight: 115.7 kg (255 lb)  SpO2: 97 %      Physical Exam  Vitals and nursing note reviewed.   Constitutional:       General: He is not in acute distress.     Appearance: Normal appearance. He is well-developed. He is ill-appearing. He is not toxic-appearing or diaphoretic.   HENT:      " Head: Normocephalic and atraumatic.      Jaw: No trismus.      Right Ear: External ear normal.      Left Ear: External ear normal.      Nose: Nose normal.      Mouth/Throat:      Mouth: Mucous membranes are moist. No oral lesions.      Tongue: No lesions.      Palate: No mass and lesions.      Pharynx: Oropharynx is clear. Uvula midline. Posterior oropharyngeal erythema present. No pharyngeal swelling, oropharyngeal exudate or uvula swelling.      Tonsils: No tonsillar exudate or tonsillar abscesses.      Comments: No trismus or airway compromise. Posterior pharyngeal erythema. No STS or PTA. OP clear and patent.    Eyes:      General: No scleral icterus.     Extraocular Movements: Extraocular movements intact.      Conjunctiva/sclera: Conjunctivae normal.   Neck:      Trachea: No tracheal deviation.   Cardiovascular:      Rate and Rhythm: Normal rate and regular rhythm.      Heart sounds: Normal heart sounds. No murmur heard.     No friction rub. No gallop.   Pulmonary:      Effort: Pulmonary effort is normal. No respiratory distress.      Breath sounds: Normal breath sounds. No stridor. No wheezing, rhonchi or rales.   Musculoskeletal:         General: Normal range of motion.      Cervical back: Normal range of motion and neck supple.   Lymphadenopathy:      Cervical: Cervical adenopathy (Small, anterior, benign) present.   Skin:     General: Skin is warm and dry.      Coloration: Skin is not pale.      Findings: No erythema or rash.   Neurological:      Mental Status: He is alert.   Psychiatric:         Mood and Affect: Mood normal.         Behavior: Behavior normal.         ED Course           Procedures                Results for orders placed or performed during the hospital encounter of 01/27/24 (from the past 24 hour(s))   [place] Draw    Narrative    The following orders were created for panel order [place] Draw.  Procedure                               Abnormality         Status                      ---------                               -----------         ------                     Extra Green Top (Lithium...[[id number]]                      In process                 Extra Purple Top Tube[[id number]]                            In process                   Please view results for these tests on the individual orders.   Comprehensive metabolic panel   Result Value Ref Range    Sodium 138 135 - 145 mmol/L    Potassium 3.7 3.4 - 5.3 mmol/L    Carbon Dioxide (CO2) 24 22 - 29 mmol/L    Anion Gap 14 7 - 15 mmol/L    Urea Nitrogen 13.2 6.0 - 20.0 mg/dL    Creatinine 0.99 0.67 - 1.17 mg/dL    GFR Estimate >90 >60 mL/min/1.73m2    Calcium 8.9 8.6 - 10.0 mg/dL    Chloride 100 98 - 107 mmol/L    Glucose 114 (H) 70 - 99 mg/dL    Alkaline Phosphatase 72 40 - 150 U/L    AST 59 (H) 0 - 45 U/L    ALT 67 0 - 70 U/L    Protein Total 7.6 6.4 - 8.3 g/dL    Albumin 4.3 3.5 - 5.2 g/dL    Bilirubin Total 0.4 <=1.2 mg/dL   CBC with platelets, differential    Narrative    The following orders were created for panel order CBC with platelets, differential.  Procedure                               Abnormality         Status                     ---------                               -----------         ------                     CBC with platelets and d...[[id number]]                      Final result                 Please view results for these tests on the individual orders.   CBC with platelets and differential   Result Value Ref Range    WBC Count 10.1 4.0 - 11.0 10e3/uL    RBC Count 4.87 4.40 - 5.90 10e6/uL    Hemoglobin 14.6 13.3 - 17.7 g/dL    Hematocrit 42.6 40.0 - 53.0 %    MCV 88 78 - 100 fL    MCH 30.0 26.5 - 33.0 pg    MCHC 34.3 31.5 - 36.5 g/dL    RDW 12.4 10.0 - 15.0 %    Platelet Count 181 150 - 450 10e3/uL    % Neutrophils 69 %    % Lymphocytes 19 %    % Monocytes 10 %    % Eosinophils 1 %    % Basophils 1 %    % Immature Granulocytes 0 %    NRBCs per 100 WBC 0 <1 /100    Absolute Neutrophils 7.0 1.6 - 8.3 10e3/uL     Absolute Lymphocytes 1.9 0.8 - 5.3 10e3/uL    Absolute Monocytes 1.0 0.0 - 1.3 10e3/uL    Absolute Eosinophils 0.1 0.0 - 0.7 10e3/uL    Absolute Basophils 0.1 0.0 - 0.2 10e3/uL    Absolute Immature Granulocytes 0.0 <=0.4 10e3/uL    Absolute NRBCs 0.0 10e3/uL   Symptomatic Influenza A/B, RSV, & SARS-CoV2 PCR (COVID-19) Nose    Specimen: Nose; Swab   Result Value Ref Range    Influenza A PCR Positive (A) Negative    Influenza B PCR Negative Negative    RSV PCR Negative Negative    SARS CoV2 PCR Negative Negative    Narrative    Testing was performed using the Xpert Xpress CoV2/Flu/RSV Assay on the [employer] Instrument. This test should be ordered for the detection of SARS-CoV-2, influenza, and RSV viruses in individuals who meet clinical and/or epidemiological criteria. Test performance is unknown in asymptomatic patients. This test is for in vitro diagnostic use under the FDA EUA for laboratories certified under CLIA to perform high or moderate complexity testing. This test has not been FDA cleared or approved. A negative result does not rule out the presence of PCR inhibitors in the specimen or target RNA in concentration below the limit of detection for the assay. If only one viral target is positive but coinfection with multiple targets is suspected, the sample should be re-tested with another FDA cleared, approved, or authorized test, if coinfection would change clinical management. This test was validated by the [facility]. These laboratories are certified under the Clinical Laboratory Improvement Amendments of 1988 (CLIA-88) as qualified to perform high complexity laboratory testing.   Group A Streptococcus PCR Throat Swab    Specimen: Throat; Swab   Result Value Ref Range    Group A strep by PCR Detected (A) Not Detected    Narrative    The Xpert Xpress Strep A test, performed on the [employer] Systems, is a rapid, qualitative in vitro diagnostic test for the detection of  Streptococcus pyogenes (Group A ß-hemolytic Streptococcus, Strep A) in throat swab specimens from patients with signs and symptoms of pharyngitis. The Xpert Xpress Strep A test can be used as an aid in the diagnosis of Group A Streptococcal pharyngitis. The assay is not intended to monitor treatment for Group A Streptococcus infections. The Xpert Xpress Strep A test utilizes an automated real-time polymerase chain reaction (PCR) to detect Streptococcus pyogenes DNA.   Chest XR,  PA & LAT    Narrative    EXAM: XR CHEST 2 VIEWS  LOCATION: [facility]  DATE: 1/27/2024    INDICATION: cough, fever. States rattling in lungs like when he has had pneumonia before  COMPARISON: None.      Impression    IMPRESSION: Cardiomediastinal silhouette within normal limits. No focal consolidation or pleural effusion.     I independently reviewed the X-rays: Agree with the Radiologist's interpretation.    Medications   dexAMETHasone (DECADRON) injectable solution used ORALLY 10 mg (has no administration in time range)   sodium chloride 0.9% BOLUS 1,000 mL (1,000 mLs Intravenous $New Bag 1/27/24 0605)       Assessments & Plan (with Medical Decision Making)   2 days of influenza A illness with positive strep PCR study in addition.  No respiratory distress or hypoxia.  No evidence of pneumonia on chest x-ray.  He was given a 1 L IV fluid bolus, and dexamethasone 10 mg p.o. for pharyngitis.  We discussed Tamiflu therapy and through shared decision making elected to defer this.  He was prescribed penicillin for strep pharyngitis, and Robitussin AC cough syrup to use if needed for severe cough or at bedtime to help him sleep.  He was provided instructions for supportive care and will return as needed for worsened condition or worsening symptoms, or new problems or concerns.    I have reviewed the nursing notes.    I have reviewed the findings, diagnosis, plan and need for follow up with the patient.    New  Prescriptions    GUAIFENESIN-CODEINE (ROBITUSSIN AC) 100-10 MG/5ML SOLUTION    Take 5-10 mLs by mouth every 4 hours as needed for cough    PENICILLIN V (VEETID) 500 MG TABLET    Take 1 tablet (500 mg) by mouth 3 times daily for 10 days       Final diagnoses:   Influenza A   Acute streptococcal pharyngitis       1/27/2024   [facility] EMERGENCY DEPT       Edmund Leslie MD  01/27/24 0749       Edmund Leslie MD  01/27/24 7960

## 2024-01-27 NOTE — ED TRIAGE NOTES
Patient reports fever, cough, generalized body aches and sore throat for past two days.      Triage Assessment (Adult)       Row Name 01/27/24 0600          Triage Assessment    Airway WDL WDL        Respiratory WDL    Respiratory WDL X;cough     Cough Frequency frequent     Cough Type dry;nonproductive        Skin Circulation/Temperature WDL    Skin Circulation/Temperature WDL WDL        Cardiac WDL    Cardiac WDL WDL        Peripheral/Neurovascular WDL    Peripheral Neurovascular WDL WDL        Cognitive/Neuro/Behavioral WDL    Cognitive/Neuro/Behavioral WDL WDL

## 2024-04-07 ENCOUNTER — APPOINTMENT (OUTPATIENT)
Dept: GENERAL RADIOLOGY | Facility: CLINIC | Age: 34
End: 2024-04-07
Payer: COMMERCIAL

## 2024-04-07 ENCOUNTER — HOSPITAL ENCOUNTER (EMERGENCY)
Facility: CLINIC | Age: 34
Discharge: HOME | End: 2024-04-07
Payer: COMMERCIAL

## 2024-04-07 VITALS
SYSTOLIC BLOOD PRESSURE: 131 MMHG | DIASTOLIC BLOOD PRESSURE: 76 MMHG | RESPIRATION RATE: 16 BRPM | OXYGEN SATURATION: 97 % | HEART RATE: 80 BPM | TEMPERATURE: 98 F

## 2024-04-07 DIAGNOSIS — S82.391A CLOSED FRACTURE OF POSTERIOR MALLEOLUS OF RIGHT TIBIA, INITIAL ENCOUNTER: Primary | ICD-10-CM

## 2024-04-07 PROCEDURE — 73610 X-RAY EXAM OF ANKLE: CPT | Mod: RT

## 2024-04-07 PROCEDURE — 73630 X-RAY EXAM OF FOOT: CPT | Mod: RT

## 2024-04-07 PROCEDURE — G0463 HOSPITAL OUTPT CLINIC VISIT: HCPCS | Mod: 25

## 2024-04-07 PROCEDURE — 29505 APPLICATION LONG LEG SPLINT: CPT | Mod: RT

## 2024-04-07 PROCEDURE — 99213 OFFICE O/P EST LOW 20 MIN: CPT | Mod: 25

## 2024-04-07 ASSESSMENT — COLUMBIA-SUICIDE SEVERITY RATING SCALE - C-SSRS
6. HAVE YOU EVER DONE ANYTHING, STARTED TO DO ANYTHING, OR PREPARED TO DO ANYTHING TO END YOUR LIFE?: NO
1. IN THE PAST MONTH, HAVE YOU WISHED YOU WERE DEAD OR WISHED YOU COULD GO TO SLEEP AND NOT WAKE UP?: NO
2. HAVE YOU ACTUALLY HAD ANY THOUGHTS OF KILLING YOURSELF IN THE PAST MONTH?: NO

## 2024-04-07 ASSESSMENT — ACTIVITIES OF DAILY LIVING (ADL): ADLS_ACUITY_SCORE: 35

## 2024-04-07 NOTE — ED PROVIDER NOTES
History     Chief Complaint   Patient presents with    Ankle Pain     HPI  Travis Carbone Jr. is a 33 year old male who presents to urgent care with right ankle pain.  Patient was playing hockey 3 hours ago when he skated into the boards and felt his ankle pop causing pain.  Patient initially attempted to resume play, but was unable to bear weight.  Putting weight on the foot causes extreme pain in his calf.  No open lacerations.    Allergies:  Allergies   Allergen Reactions    Sulfa Antibiotics Other (See Comments)     Redness in eyes from eye sulfa medicine       Problem List:    Patient Active Problem List    Diagnosis Date Noted    Anxiety 03/06/2018     Priority: Medium        Past Medical History:    No past medical history on file.    Past Surgical History:    No past surgical history on file.    Family History:    Family History   Problem Relation Age of Onset    Breast Cancer Mother        Social History:  Marital Status:   [2]  Social History     Tobacco Use    Smoking status: Never    Smokeless tobacco: Never   Substance Use Topics    Alcohol use: No    Drug use: No        Medications:    escitalopram (LEXAPRO) 10 MG tablet  guaiFENesin-codeine (ROBITUSSIN AC) 100-10 MG/5ML solution          Review of Systems    Pertinent ROS in HPI, all other systems reviewed and are negative.    Physical Exam   BP: 131/76  Pulse: 80  Temp: 98  F (36.7  C)  Resp: 16  SpO2: 97 %    Exam:  Constitutional: healthy, alert, and no distress  Head: Normocephalic. No masses, lesions, tenderness or abnormalities  Cardiovascular: negative, PMI normal. No lifts, heaves, or thrills. RRR. No murmurs, clicks gallops or rub  Respiratory: negative, Percussion normal. Good diaphragmatic excursion. Lungs clear  Musculoskeletal: Moderate swelling and bruising over anterior ankle and foot.  Tenderness to palpation of posterior malleolus.  Patient unable to walk more than 6 steps.  PT and DP pulses 3+.  Neurologic: Gait normal.  Reflexes normal and symmetric. Sensation grossly WNL.    ED Course        [facility]    Splint Application    Date/Time: 4/23/2024 2:36 PM    Performed by: Angy Martinez PA-C  Authorized by: Angy Martinez PA-C    Risks, benefits and alternatives discussed.      PRE-PROCEDURE DETAILS     Sensation:  Normal    PROCEDURE DETAILS     Laterality:  Right    Location:  Ankle    Ankle:  R ankle    Splint type:  Long leg    Supplies:  Cotton padding and Ortho-Glass    POST PROCEDURE DETAILS     Pain:  Unchanged    Sensation:  Normal      PROCEDURE    Patient Tolerance:  Patient tolerated the procedure well with no immediate complications        Assessments & Plan (with Medical Decision Making)     I have reviewed the nursing notes.    I have reviewed the findings, diagnosis, plan and need for follow up with the patient.    Medical Decision Making    Pt is a 33 year old male who presents to urgent care with right ankle pain.  Patient was playing hockey 3 hours ago when he skated into the boards and felt his ankle pop causing pain.  Patient initially attempted to resume play, but was unable to bear weight.  Putting weight on the foot causes extreme pain in his calf.  No open lacerations.    Exam as stated above.    Right foot and ankle x-ray obtained and personally reviewed revealing: Nondisplaced posterior malleolus fracture, best on the lateral view. Ankle joint effusion with ankle soft tissue swelling anteriorly and laterally. Intact ankle mortise and distal syndesmosis. Tibiotalar and hindfoot joint spaces are normal. No definitive distal fibula or medial malleolus fracture.      Orthopedic referral placed.  Patient should follow-up with Ortho within the next week.  Patient is agreeable to plan and all questions were answered.  Patient discharged home.        Discharge Medication List as of 4/7/2024  5:54 PM          Final diagnoses:   Closed fracture of posterior malleolus of right  tibia, initial encounter       4/7/2024   [facility] EMERGENCY DEPT       Angy Martinez PA-C  04/23/24 1524

## 2024-04-07 NOTE — Clinical Note
Trvais Carbone was seen and treated in our emergency department on 4/7/2024.  He may return to work on 04/08/2024.  Patient is non-weight bearing until orthopedic follow-up.      If you have any questions or concerns, please don't hesitate to call.      Angy Martinez PA-C

## 2024-04-08 ENCOUNTER — OFFICE VISIT (OUTPATIENT)
Dept: PODIATRY | Facility: CLINIC | Age: 34
End: 2024-04-08
Payer: COMMERCIAL

## 2024-04-08 VITALS
WEIGHT: 255 LBS | DIASTOLIC BLOOD PRESSURE: 79 MMHG | SYSTOLIC BLOOD PRESSURE: 123 MMHG | HEART RATE: 68 BPM | BODY MASS INDEX: 31.71 KG/M2 | HEIGHT: 75 IN

## 2024-04-08 DIAGNOSIS — S82.391A CLOSED FRACTURE OF POSTERIOR MALLEOLUS OF RIGHT TIBIA, INITIAL ENCOUNTER: ICD-10-CM

## 2024-04-08 PROCEDURE — 99203 OFFICE O/P NEW LOW 30 MIN: CPT | Performed by: PODIATRIST

## 2024-04-08 ASSESSMENT — PAIN SCALES - GENERAL: PAINLEVEL: MODERATE PAIN (4)

## 2024-04-08 NOTE — LETTER
April 8, 2024      Travis Carbone Jr.  [address] [facility] [address]        To Whom It May Concern:    Travis Carbone Jr. was seen in our clinic. He may return to work with the following: limited to light duty - standing limited to 0 hrs, walking limited to 0 hrs, and desk work only on or about 4/8/24 thru 5/21/24.      Sincerely,      Lamonte Savage

## 2024-04-08 NOTE — PROGRESS NOTES
PATIENT HISTORY:  Travis Carbone Jr. is a 33 year old male who presents with a chief complaint of a painful right ankle.  The patient relates injuring the right ankle on 04/07/2024 while at a work function.  The patient states that fell on a hockey game.  The patient relates pain with weight bearing to the right.   The patient relates being seen and treated with ice, elevation, and nonweightbearing with crutches.  The patient denies any numbness to the toes on the right foot.    REVIEW OF SYSTEMS:  Constitutional, HEENT, cardiovascular, pulmonary, GI, , musculoskeletal, neuro, skin, endocrine and psych systems are negative, except as otherwise noted.     PAST MEDICAL HISTORY: No past medical history on file.     PAST SURGICAL HISTORY: No past surgical history on file.     MEDICATIONS:   Current Outpatient Medications:     escitalopram (LEXAPRO) 10 MG tablet, TAKE 1 TABLET (10 MG) BY MOUTH DAILY, Disp: 30 tablet, Rfl: 0    guaiFENesin-codeine (ROBITUSSIN AC) 100-10 MG/5ML solution, Take 5-10 mLs by mouth every 4 hours as needed for cough, Disp: 120 mL, Rfl: 0     ALLERGIES:    Allergies   Allergen Reactions    Sulfa Antibiotics Other (See Comments)     Redness in eyes from eye sulfa medicine        SOCIAL HISTORY:   Social History     Socioeconomic History    Marital status:      Spouse name: Not on file    Number of children: Not on file    Years of education: Not on file    Highest education level: Not on file   Occupational History    Not on file   Tobacco Use    Smoking status: Never    Smokeless tobacco: Never   Substance and Sexual Activity    Alcohol use: No    Drug use: No    Sexual activity: Yes     Partners: Female     Birth control/protection: None   Other Topics Concern    Parent/sibling w/ CABG, MI or angioplasty before 65F 55M? No   Social History Narrative    Not on file     Social Determinants of Health     Financial Resource Strain: Not on file   Food Insecurity: Not on file  "  Transportation Needs: Not on file   Physical Activity: Not on file   Stress: Not on file   Social Connections: Not on file   Interpersonal Safety: Not on file   Housing Stability: Not on file        FAMILY HISTORY:   Family History   Problem Relation Age of Onset    Breast Cancer Mother         EXAM:Vitals: /79   Pulse 68   Ht 1.905 m (6' 3\")   Wt 115.7 kg (255 lb)   BMI 31.87 kg/m    BMI= Body mass index is 31.87 kg/m .    General appearance: Patient is alert and fully cooperative with history & exam.  No sign of distress is noted during the visit.     Psychiatric: Affect is pleasant & appropriate.  Patient appears motivated to improve health.     Respiratory: Breathing is regular & unlabored while sitting.     HEENT: Hearing is intact to spoken word.  Speech is clear.  No gross evidence of visual impairment that would impact ambulation.     Dermatologic: Skin is intact with no laceration for fracture blisters.        Vascular: DP & PT pulses are difficult to palpate due to the edema.  CFT and skin temperature is normal to both lower extremities.     Neurologic: Lower extremity sensation is intact to light touch.  No evidence of weakness or contracture in the lower extremities.        Musculoskeletal: One notes decreased ankle joint ROM due to swelling and pain on the right.  Point of maximum tenderness located over the posterior aspect of the right ankle.  One notes no pain with palpation over the medial deltoid ligament on the right.  Moderate edema noted.  Positive ecchymosis noted.      Radiograph evaluation including AP, lateral and mortise views of the right ankle reveals a nondisplaced fracture of the posterior malleolus.  The ankle mortise appears symmetrical stable.  No evidence of medial or lateral malleolus fracture.           ASSESSMENT / PLAN:     ICD-10-CM    1. Closed fracture of posterior malleolus of right tibia, initial encounter  S82.391A Orthopedic  Referral          I have " explained to Travis  about the conditions.  We discussed the nature of the condition as well as the treatment plan and expected length of recovery.  At this point, I am recommending conservative care.  The patient was instructed to continue with non weight bearing with use of crutches or a knee scooter and a CAM boot for stabilization and protection.  Patient was fitted with a cam boot on the right leg.  The patient was instructed to start soaking the ankle in warm water and perform light range of motion exercises as tolerated.  The patient will return in one month for reevaluation and repeat x-rays.         Travis verbalized agreement with and understanding of the rational for the diagnosis and treatment plan.  All questions were answered to best of my ability and the patient's satisfaction. The patient was advised to contact the clinic with any questions that may arise.      Disclaimer: This note consists of symbols derived from keyboarding, dictation and/or voice recognition software. As a result, there may be errors in the script that have gone undetected. Please consider this when interpreting information found in this chart.       NATHANIEL Savage D.P.M., F.A.C.F.A.S.

## 2024-04-08 NOTE — NURSING NOTE
"Chief Complaint   Patient presents with    Fracture     Right ankle injury       Initial /79   Pulse 68   Ht 1.905 m (6' 3\")   Wt 115.7 kg (255 lb)   BMI 31.87 kg/m   Estimated body mass index is 31.87 kg/m  as calculated from the following:    Height as of this encounter: 1.905 m (6' 3\").    Weight as of this encounter: 115.7 kg (255 lb).  Medications and allergies reviewed.      Renetta MORRISON MA    "

## 2024-04-08 NOTE — LETTER
4/8/2024         RE: Travis Carbone Jr.  [address] [facility] [address]        Dear Colleague,    Thank you for referring your patient, Travis Carbone Jr., to the [facility] [place]. Please see a copy of my visit note below.    PATIENT HISTORY:  Tarvis Carbone Jr. is a 33 year old male who presents with a chief complaint of a painful right ankle.  The patient relates injuring the right ankle on 04/07/2024 while at a work function.  The patient states that fell on a hockey game.  The patient relates pain with weight bearing to the right.   The patient relates being seen and treated with ice, elevation, and nonweightbearing with crutches.  The patient denies any numbness to the toes on the right foot.    REVIEW OF SYSTEMS:  Constitutional, HEENT, cardiovascular, pulmonary, GI, , musculoskeletal, neuro, skin, endocrine and psych systems are negative, except as otherwise noted.     PAST MEDICAL HISTORY: No past medical history on file.     PAST SURGICAL HISTORY: No past surgical history on file.     MEDICATIONS:   Current Outpatient Medications:      escitalopram (LEXAPRO) 10 MG tablet, TAKE 1 TABLET (10 MG) BY MOUTH DAILY, Disp: 30 tablet, Rfl: 0     guaiFENesin-codeine (ROBITUSSIN AC) 100-10 MG/5ML solution, Take 5-10 mLs by mouth every 4 hours as needed for cough, Disp: 120 mL, Rfl: 0     ALLERGIES:    Allergies   Allergen Reactions     Sulfa Antibiotics Other (See Comments)     Redness in eyes from eye sulfa medicine        SOCIAL HISTORY:   Social History     Socioeconomic History     Marital status:      Spouse name: Not on file     Number of children: Not on file     Years of education: Not on file     Highest education level: Not on file   Occupational History     Not on file   Tobacco Use     Smoking status: Never     Smokeless tobacco: Never   Substance and Sexual Activity     Alcohol use: No     Drug use: No     Sexual activity: Yes     Partners: Female     " Birth control/protection: None   Other Topics Concern     Parent/sibling w/ CABG, MI or angioplasty before 65F 55M? No   Social History Narrative     Not on file     Social Determinants of Health     Financial Resource Strain: Not on file   Food Insecurity: Not on file   Transportation Needs: Not on file   Physical Activity: Not on file   Stress: Not on file   Social Connections: Not on file   Interpersonal Safety: Not on file   Housing Stability: Not on file        FAMILY HISTORY:   Family History   Problem Relation Age of Onset     Breast Cancer Mother         EXAM:Vitals: /79   Pulse 68   Ht 1.905 m (6' 3\")   Wt 115.7 kg (255 lb)   BMI 31.87 kg/m    BMI= Body mass index is 31.87 kg/m .    General appearance: Patient is alert and fully cooperative with history & exam.  No sign of distress is noted during the visit.     Psychiatric: Affect is pleasant & appropriate.  Patient appears motivated to improve health.     Respiratory: Breathing is regular & unlabored while sitting.     HEENT: Hearing is intact to spoken word.  Speech is clear.  No gross evidence of visual impairment that would impact ambulation.     Dermatologic: Skin is intact with no laceration for fracture blisters.        Vascular: DP & PT pulses are difficult to palpate due to the edema.  CFT and skin temperature is normal to both lower extremities.     Neurologic: Lower extremity sensation is intact to light touch.  No evidence of weakness or contracture in the lower extremities.        Musculoskeletal: One notes decreased ankle joint ROM due to swelling and pain on the right.  Point of maximum tenderness located over the posterior aspect of the right ankle.  One notes no pain with palpation over the medial deltoid ligament on the right.  Moderate edema noted.  Positive ecchymosis noted.      Radiograph evaluation including AP, lateral and mortise views of the right ankle reveals a nondisplaced fracture of the posterior malleolus.  The " ankle mortise appears symmetrical stable.  No evidence of medial or lateral malleolus fracture.           ASSESSMENT / PLAN:     ICD-10-CM    1. Closed fracture of posterior malleolus of right tibia, initial encounter  S82.391A Orthopedic  Referral          I have explained to Travis  about the conditions.  We discussed the nature of the condition as well as the treatment plan and expected length of recovery.  At this point, I am recommending conservative care.  The patient was instructed to continue with non weight bearing with use of crutches or a knee scooter and a CAM boot for stabilization and protection.  Patient was fitted with a cam boot on the right leg.  The patient was instructed to start soaking the ankle in warm water and perform light range of motion exercises as tolerated.  The patient will return in one month for reevaluation and repeat x-rays.         Travis verbalized agreement with and understanding of the rational for the diagnosis and treatment plan.  All questions were answered to best of my ability and the patient's satisfaction. The patient was advised to contact the clinic with any questions that may arise.      Disclaimer: This note consists of symbols derived from keyboarding, dictation and/or voice recognition software. As a result, there may be errors in the script that have gone undetected. Please consider this when interpreting information found in this chart.       TAMIKO Skelton.MORIAH.XIOMARA., F.A.C.F.A.S.      Again, thank you for allowing me to participate in the care of your patient.        Sincerely,        Lamonte Savage DPM

## 2024-05-06 ENCOUNTER — OFFICE VISIT (OUTPATIENT)
Dept: PODIATRY | Facility: CLINIC | Age: 34
End: 2024-05-06
Payer: COMMERCIAL

## 2024-05-06 ENCOUNTER — ANCILLARY PROCEDURE (OUTPATIENT)
Dept: GENERAL RADIOLOGY | Facility: CLINIC | Age: 34
End: 2024-05-06
Attending: PODIATRIST
Payer: COMMERCIAL

## 2024-05-06 VITALS
HEIGHT: 75 IN | WEIGHT: 255 LBS | HEART RATE: 68 BPM | DIASTOLIC BLOOD PRESSURE: 80 MMHG | BODY MASS INDEX: 31.71 KG/M2 | SYSTOLIC BLOOD PRESSURE: 131 MMHG

## 2024-05-06 DIAGNOSIS — S82.391A CLOSED FRACTURE OF POSTERIOR MALLEOLUS OF RIGHT TIBIA, INITIAL ENCOUNTER: Primary | ICD-10-CM

## 2024-05-06 PROCEDURE — 73610 X-RAY EXAM OF ANKLE: CPT | Mod: TC | Performed by: RADIOLOGY

## 2024-05-06 PROCEDURE — 99213 OFFICE O/P EST LOW 20 MIN: CPT | Performed by: PODIATRIST

## 2024-05-06 NOTE — LETTER
"    5/6/2024         RE: Travis Carbone Jr.  [address]  [place] [address]        Dear Colleague,    Thank you for referring your patient, Travis Carbone Jr., to the [facility] [place]. Please see a copy of my visit note below.    Travis returns to the office for reevaluation of the right ankle.  The patient relates following the instructions given at the last visit with noted overall less pain and more improvement in function of the right ankle.   The patient relates no other problems.    Vitals: /80   Pulse 68   Ht 1.905 m (6' 3\")   Wt 115.7 kg (255 lb)   BMI 31.87 kg/m    BMI= Body mass index is 31.87 kg/m .    Lower Extremity Physical Exam:      Neurovascular status remains unchanged.  Muscular exam is within normal limits to major muscle groups.  Integument is intact.      Noted decreased pain on palpation over the posterior aspect of the right ankle.  Decreased edema noted.  Improved ankle range of motion noted on the right.    Diagnostics:  Radiograph evaluation including three views of the right ankle reveals interval healing with increased trabeculation of the posterior malleolar fracture.  I personally evaluated the images as well as reviewed the images with the patient pointing out the findings.      Assessment:     ICD-10-CM    1. Closed fracture of posterior malleolus of right tibia, initial encounter  S82.391A XR Ankle Right G/E 3 Views     Ankle/Foot Bracing Supplies Order Ankle Brace; Right     Physical Therapy  Referral          Plan:    I have explained to Travis about the progress of the conditions.  At this time, the patient was educated on the importance of offloading supportive shoes and other devices.  I demonstrated to the patient calf stretches to perform every hour daily until symptoms resolve.  After symptoms resolve, the patient was advised to perform the stretches 3 times daily to prevent future recurrence.  The patient was " instructed to perform warm soaks with Epson salt after which to also apply over-the-counter Voltaren gel to deeply massage the injured tissue.  The patient was instructed to do this on a daily basis until symptoms resolve.  The patient was fitted with a Trilok ankle brace that will aid in offloading the tension forces to the soft tissues and prevent further inflammation.  The patient was referred to physical therapy for ankle rehabilitation.  The patient may return in four weeks for reevaluation to determine if any further treatment will be needed.      Travis verbalized agreement with and understanding of the rational for the diagnosis and treatment plan.  All questions were answered to best of my ability and the patient's satisfaction. The patient was advised to contact the clinic with any questions that may arise after the clinic visit.      Disclaimer: This note consists of symbols derived from keyboarding, dictation and/or voice recognition software. As a result, there may be errors in the script that have gone undetected. Please consider this when interpreting information found in this chart.       NATHANIEL Savage D.P.M., F.A.C.F.A.S.      Again, thank you for allowing me to participate in the care of your patient.        Sincerely,        Lamonte Savage DPM

## 2024-05-06 NOTE — PROGRESS NOTES
"Travis returns to the office for reevaluation of the right ankle.  The patient relates following the instructions given at the last visit with noted overall less pain and more improvement in function of the right ankle.   The patient relates no other problems.    Vitals: /80   Pulse 68   Ht 1.905 m (6' 3\")   Wt 115.7 kg (255 lb)   BMI 31.87 kg/m    BMI= Body mass index is 31.87 kg/m .    Lower Extremity Physical Exam:      Neurovascular status remains unchanged.  Muscular exam is within normal limits to major muscle groups.  Integument is intact.      Noted decreased pain on palpation over the posterior aspect of the right ankle.  Decreased edema noted.  Improved ankle range of motion noted on the right.    Diagnostics:  Radiograph evaluation including three views of the right ankle reveals interval healing with increased trabeculation of the posterior malleolar fracture.  I personally evaluated the images as well as reviewed the images with the patient pointing out the findings.      Assessment:     ICD-10-CM    1. Closed fracture of posterior malleolus of right tibia, initial encounter  S82.391A XR Ankle Right G/E 3 Views     Ankle/Foot Bracing Supplies Order Ankle Brace; Right     Physical Therapy  Referral          Plan:    I have explained to Travis about the progress of the conditions.  At this time, the patient was educated on the importance of offloading supportive shoes and other devices.  I demonstrated to the patient calf stretches to perform every hour daily until symptoms resolve.  After symptoms resolve, the patient was advised to perform the stretches 3 times daily to prevent future recurrence.  The patient was instructed to perform warm soaks with Epson salt after which to also apply over-the-counter Voltaren gel to deeply massage the injured tissue.  The patient was instructed to do this on a daily basis until symptoms resolve.  The patient was fitted with a Trilok ankle brace that " will aid in offloading the tension forces to the soft tissues and prevent further inflammation.  The patient was referred to physical therapy for ankle rehabilitation.  The patient may return in four weeks for reevaluation to determine if any further treatment will be needed.      Travis verbalized agreement with and understanding of the rational for the diagnosis and treatment plan.  All questions were answered to best of my ability and the patient's satisfaction. The patient was advised to contact the clinic with any questions that may arise after the clinic visit.      Disclaimer: This note consists of symbols derived from keyboarding, dictation and/or voice recognition software. As a result, there may be errors in the script that have gone undetected. Please consider this when interpreting information found in this chart.       NATHANIEL Savage D.P.M., F.A.C.F.A.S.

## 2024-05-06 NOTE — PATIENT INSTRUCTIONS
Ankle rehabilitation:  2 weeks range of motion (non-weightbearing), 2 weeks calf strengthening (protected weightbearing in a CAM boot, 2 weeks ankle balancing (Full weightbearing)

## 2024-05-06 NOTE — NURSING NOTE
"Chief Complaint   Patient presents with    Fracture Followup     Right ankle- no concerns       Initial /80   Pulse 68   Ht 1.905 m (6' 3\")   Wt 115.7 kg (255 lb)   BMI 31.87 kg/m   Estimated body mass index is 31.87 kg/m  as calculated from the following:    Height as of this encounter: 1.905 m (6' 3\").    Weight as of this encounter: 115.7 kg (255 lb).  Medications and allergies reviewed.      Renetta MORRISON MA    "

## 2024-05-09 ENCOUNTER — THERAPY VISIT (OUTPATIENT)
Dept: PHYSICAL THERAPY | Facility: CLINIC | Age: 34
End: 2024-05-09
Attending: PODIATRIST
Payer: COMMERCIAL

## 2024-05-09 DIAGNOSIS — S82.391A CLOSED FRACTURE OF POSTERIOR MALLEOLUS OF RIGHT TIBIA, INITIAL ENCOUNTER: ICD-10-CM

## 2024-05-09 PROCEDURE — 97112 NEUROMUSCULAR REEDUCATION: CPT | Mod: GP

## 2024-05-09 PROCEDURE — 97161 PT EVAL LOW COMPLEX 20 MIN: CPT | Mod: GP

## 2024-05-09 PROCEDURE — 97110 THERAPEUTIC EXERCISES: CPT | Mod: GP

## 2024-05-09 NOTE — PROGRESS NOTES
PHYSICAL THERAPY EVALUATION  Type of Visit: Evaluation    See electronic medical record for Abuse and Falls Screening details.    Subjective       Presenting condition or subjective complaint:   fractured while playing hockey. Pt able to continue to play following the fall and slide into the boards that resulted in the fracture. Pt has been in a cam book since injury and only transitionied into a brace at most recent follow-up with podiatry. He was able to walk 1/5 mile yesterday with minimal pain but continues to feel pain with stair navigation.   Date of onset: 04/07/24      Prior diagnostic imaging/testing results:     Xrays taken on 5/9/24  indicated continued healing fracture.     Employment:      Hobbies/Interests:  Hockey, golf, motorcycle    Patient goals for therapy:  be able to return to work, play hockey, ride motorcycle, and golf.     Pain assessment: See objective evaluation for additional pain details     Objective   FOOT/ANKLE EVALUATION  PAIN: Pain is Exacerbated By: walking, stairs, running, work duties  Pain is Relieved By: cold and elevation.     GAIT:   Weightbearing Status: WBAT  Assistive Device(s): Brace  Gait Deviations: Antalgic - decreased heel strike on R, reduced toe clearance on R  BALANCE/PROPRIOCEPTION: WFL  WEIGHT BEARING ALIGNMENT: WFL  NON-WEIGHTBEARING ALIGNMENT: WFL   ROM:   (Degrees) Left AROM Left MMT  Right AROM Right MMT   Ankle Dorsiflexion 12 5 10 4+   Ankle Plantarflexion 38 5 16 Not tested d/t recent fracture   Ankle Inversion 40 5 20 4+   Ankle Eversion 15 5 12 4+   Great Toe Flexion       Great Toe Extension         FLEXIBILITY:  Posterior heel cord length limiting ankle DF B    PALPATION:  TTP and increased tension noted in B gastroc/soleus complex, right peroneals, and over Right ATFL.   JOINT MOBILITY:  Reduced talocrural and mid-foot joint mobility.     Assessment & Plan   CLINICAL IMPRESSIONS  Medical Diagnosis: Clsoed fracture of posterior malleolus  of right tibia, initial encounter    Treatment Diagnosis: Clsoed fracture of posterior malleolus of right tibia, initial encounter   Impression/Assessment: Patient is a 33 year old male s/p right tibial posterior malleoli fracture with ongoing ankle pain and limited mobility complaints.  The following significant findings have been identified: Pain, Decreased ROM/flexibility, Decreased joint mobility, Decreased strength, Impaired balance, Decreased proprioception, Impaired gait, Impaired muscle performance, and Decreased activity tolerance. These impairments interfere with their ability to perform self care tasks, work tasks, recreational activities, household chores, driving , household mobility, and community mobility as compared to previous level of function.     Clinical Decision Making (Complexity):  Clinical Presentation: Stable/Uncomplicated  Clinical Presentation Rationale: based on medical and personal factors listed in PT evaluation  Clinical Decision Making (Complexity): Low complexity    PLAN OF CARE  Treatment Interventions:  Modalities: Cryotherapy, E-stim, Hot Pack  Interventions: Gait Training, Manual Therapy, Neuromuscular Re-education, Therapeutic Activity, Therapeutic Exercise    Long Term Goals     PT Goal 1  Goal Identifier: LTG 1 - Running  Goal Description: Pt will be able to run as needed for work and exercise without increased pain.  Target Date: 07/04/24  PT Goal 2  Goal Identifier: LTg 2 - Mobility  Goal Description: Pt will be able to ascend and descend stairs with reciprocal gait pattern in order to improve tolerance to functional daily mobiltiy.  Target Date: 07/04/24      Frequency of Treatment: 1x/week  Duration of Treatment: 8 week    Education Assessment:   Learner/Method: Patient;No Barriers to Learning    Risks and benefits of evaluation/treatment have been explained.   Patient/Family/caregiver agrees with Plan of Care.     Evaluation Time:     PT Eval, Low Complexity Minutes  (79618): 15       Signing Clinician: Lori Mendosa, PT

## 2024-05-14 ENCOUNTER — MYC MEDICAL ADVICE (OUTPATIENT)
Dept: PODIATRY | Facility: CLINIC | Age: 34
End: 2024-05-14
Payer: COMMERCIAL

## 2024-05-14 NOTE — LETTER
April 8, 2024      Travis Carbone Jr.  [address]  [place] [address]        To Whom It May Concern:    Travis Carbone Jr is under my care for his right ankle injury.  He may return to work beginning 5/20/24 without any formal work restrictions.  Patient is scheduled for follow up with me in on 6/3/24.  Updated restrictions will be provided as needed at that time.      Sincerely,      XIOMARA Savage DPM

## 2024-05-29 ENCOUNTER — THERAPY VISIT (OUTPATIENT)
Dept: PHYSICAL THERAPY | Facility: CLINIC | Age: 34
End: 2024-05-29
Payer: COMMERCIAL

## 2024-05-29 DIAGNOSIS — S82.391A CLOSED FRACTURE OF POSTERIOR MALLEOLUS OF RIGHT TIBIA, INITIAL ENCOUNTER: Primary | ICD-10-CM

## 2024-05-29 PROCEDURE — 97110 THERAPEUTIC EXERCISES: CPT | Mod: GP | Performed by: PHYSICAL THERAPIST

## 2024-05-29 PROCEDURE — 97140 MANUAL THERAPY 1/> REGIONS: CPT | Mod: GP | Performed by: PHYSICAL THERAPIST

## 2024-05-29 PROCEDURE — 97112 NEUROMUSCULAR REEDUCATION: CPT | Mod: GP | Performed by: PHYSICAL THERAPIST

## 2024-06-03 ENCOUNTER — OFFICE VISIT (OUTPATIENT)
Dept: PODIATRY | Facility: CLINIC | Age: 34
End: 2024-06-03
Payer: COMMERCIAL

## 2024-06-03 ENCOUNTER — ANCILLARY PROCEDURE (OUTPATIENT)
Dept: GENERAL RADIOLOGY | Facility: CLINIC | Age: 34
End: 2024-06-03
Attending: PODIATRIST
Payer: COMMERCIAL

## 2024-06-03 VITALS
HEART RATE: 62 BPM | WEIGHT: 255 LBS | BODY MASS INDEX: 31.71 KG/M2 | DIASTOLIC BLOOD PRESSURE: 83 MMHG | SYSTOLIC BLOOD PRESSURE: 120 MMHG | HEIGHT: 75 IN

## 2024-06-03 DIAGNOSIS — S82.391A CLOSED FRACTURE OF POSTERIOR MALLEOLUS OF RIGHT TIBIA, INITIAL ENCOUNTER: ICD-10-CM

## 2024-06-03 DIAGNOSIS — S82.391A CLOSED FRACTURE OF POSTERIOR MALLEOLUS OF RIGHT TIBIA, INITIAL ENCOUNTER: Primary | ICD-10-CM

## 2024-06-03 PROCEDURE — 99213 OFFICE O/P EST LOW 20 MIN: CPT | Performed by: PODIATRIST

## 2024-06-03 PROCEDURE — 73610 X-RAY EXAM OF ANKLE: CPT | Mod: TC | Performed by: RADIOLOGY

## 2024-06-03 NOTE — NURSING NOTE
"Chief Complaint   Patient presents with    Fracture Followup     Right ankle- no concerns- started physical therapy and is doing well       Initial /83   Pulse 62   Ht 1.905 m (6' 3\")   Wt 115.7 kg (255 lb)   BMI 31.87 kg/m   Estimated body mass index is 31.87 kg/m  as calculated from the following:    Height as of this encounter: 1.905 m (6' 3\").    Weight as of this encounter: 115.7 kg (255 lb).  Medications and allergies reviewed.      Renetta MORRISON MA    "

## 2024-06-03 NOTE — PROGRESS NOTES
"Travis returns to the office for reevaluation of the right ankle.  The patient relates following the instructions given at the last visit with noted overall less pain and more improvement in function of the right ankle.   The patient relates no other problems.    Vitals: /83   Pulse 62   Ht 1.905 m (6' 3\")   Wt 115.7 kg (255 lb)   BMI 31.87 kg/m    BMI= Body mass index is 31.87 kg/m .    Lower Extremity Physical Exam:      Neurovascular status remains unchanged.  Muscular exam is within normal limits to major muscle groups.  Integument is intact.      Noted decreased pain on palpation over the posterior aspect of the right ankle.  Decreased edema noted.    Diagnostics:  Radiograph evaluation including three views of the right ankle reveals interval healing with increased trabeculation of the posterior malleolus fracture.  I personally evaluated the images as well as reviewed the images with the patient pointing out the findings.      Assessment:     ICD-10-CM    1. Closed fracture of posterior malleolus of right tibia, subsequent encounter  S82.391A XR Ankle Right G/E 3 Views          Plan:    I have explained to Travis about the progress of the conditions.  At this time, .the patient was encouraged to continue wearing offloading supportive shoes and other devices.  The patient was encouraged to perform calf stretches 3 times daily to prevent future recurrence.  The patient was instructed to continue to perform warm soaks with Epson salt after which to also apply over-the-counter Voltaren gel to deeply massage the injured tissue.   The patient may return in four weeks for reevaluation if problems persist to determine if any further treatment will be needed.      Travis verbalized agreement with and understanding of the rational for the diagnosis and treatment plan.  All questions were answered to best of my ability and the patient's satisfaction. The patient was advised to contact the clinic with any " questions that may arise after the clinic visit.      Disclaimer: This note consists of symbols derived from keyboarding, dictation and/or voice recognition software. As a result, there may be errors in the script that have gone undetected. Please consider this when interpreting information found in this chart.       NATHANIEL Savage D.P.M., ANANTH.F.ANU.S.

## 2024-06-03 NOTE — LETTER
"6/3/2024      Travis Carbone Jr.  [address]  [place] [address]      Dear Colleague,    Thank you for referring your patient, Travis Carbone Jr., to the [facility] [place]. Please see a copy of my visit note below.    Travis returns to the office for reevaluation of the right ankle.  The patient relates following the instructions given at the last visit with noted overall less pain and more improvement in function of the right ankle.   The patient relates no other problems.    Vitals: /83   Pulse 62   Ht 1.905 m (6' 3\")   Wt 115.7 kg (255 lb)   BMI 31.87 kg/m    BMI= Body mass index is 31.87 kg/m .    Lower Extremity Physical Exam:      Neurovascular status remains unchanged.  Muscular exam is within normal limits to major muscle groups.  Integument is intact.      Noted decreased pain on palpation over the posterior aspect of the right ankle.  Decreased edema noted.    Diagnostics:  Radiograph evaluation including three views of the right ankle reveals interval healing with increased trabeculation of the posterior malleolus fracture.  I personally evaluated the images as well as reviewed the images with the patient pointing out the findings.      Assessment:     ICD-10-CM    1. Closed fracture of posterior malleolus of right tibia, subsequent encounter  S82.391A XR Ankle Right G/E 3 Views          Plan:    I have explained to Travis about the progress of the conditions.  At this time, .the patient was encouraged to continue wearing offloading supportive shoes and other devices.  The patient was encouraged to perform calf stretches 3 times daily to prevent future recurrence.  The patient was instructed to continue to perform warm soaks with Epson salt after which to also apply over-the-counter Voltaren gel to deeply massage the injured tissue.   The patient may return in four weeks for reevaluation if problems persist to determine if any further treatment will be " tammie.      Travis verbalized agreement with and understanding of the rational for the diagnosis and treatment plan.  All questions were answered to best of my ability and the patient's satisfaction. The patient was advised to contact the clinic with any questions that may arise after the clinic visit.      Disclaimer: This note consists of symbols derived from keyboarding, dictation and/or voice recognition software. As a result, there may be errors in the script that have gone undetected. Please consider this when interpreting information found in this chart.       TAMIKO Skelton.P.XIOMARA., F.A.C.F.A.S.      Again, thank you for allowing me to participate in the care of your patient.        Sincerely,        Lamonte Savage DPM

## 2024-06-05 ENCOUNTER — THERAPY VISIT (OUTPATIENT)
Dept: PHYSICAL THERAPY | Facility: CLINIC | Age: 34
End: 2024-06-05
Payer: COMMERCIAL

## 2024-06-05 DIAGNOSIS — S82.391A CLOSED FRACTURE OF POSTERIOR MALLEOLUS OF RIGHT TIBIA, INITIAL ENCOUNTER: Primary | ICD-10-CM

## 2024-06-05 PROCEDURE — 97110 THERAPEUTIC EXERCISES: CPT | Mod: GP | Performed by: PHYSICAL THERAPIST

## 2024-06-05 PROCEDURE — 97140 MANUAL THERAPY 1/> REGIONS: CPT | Mod: GP | Performed by: PHYSICAL THERAPIST

## 2024-06-05 PROCEDURE — 97112 NEUROMUSCULAR REEDUCATION: CPT | Mod: GP | Performed by: PHYSICAL THERAPIST

## 2024-09-06 PROBLEM — S82.391A CLOSED FRACTURE OF POSTERIOR MALLEOLUS OF RIGHT TIBIA, INITIAL ENCOUNTER: Status: RESOLVED | Noted: 2024-05-09 | Resolved: 2024-09-06
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
## 2024-11-18 ENCOUNTER — VIRTUAL VISIT (OUTPATIENT)
Dept: FAMILY MEDICINE | Facility: CLINIC | Age: 34
End: 2024-11-18
Payer: COMMERCIAL

## 2024-11-18 DIAGNOSIS — K76.0 NAFLD (NONALCOHOLIC FATTY LIVER DISEASE): ICD-10-CM

## 2024-11-18 DIAGNOSIS — R79.89 ELEVATED LFTS: Primary | ICD-10-CM

## 2024-11-18 DIAGNOSIS — Z13.220 SCREENING FOR LIPOID DISORDERS: ICD-10-CM

## 2024-11-18 DIAGNOSIS — F43.22 ADJUSTMENT DISORDER WITH ANXIOUS MOOD: ICD-10-CM

## 2024-11-18 DIAGNOSIS — Z13.1 ENCOUNTER FOR SCREENING EXAMINATION FOR IMPAIRED GLUCOSE REGULATION AND DIABETES MELLITUS: ICD-10-CM

## 2024-11-18 PROCEDURE — 99213 OFFICE O/P EST LOW 20 MIN: CPT | Mod: 95 | Performed by: PHYSICIAN ASSISTANT

## 2024-11-18 PROCEDURE — G2211 COMPLEX E/M VISIT ADD ON: HCPCS | Mod: 95 | Performed by: PHYSICIAN ASSISTANT

## 2024-11-18 RX ORDER — ESCITALOPRAM OXALATE 10 MG/1
10 TABLET ORAL DAILY
Qty: 90 TABLET | Refills: 0 | Status: SHIPPED | OUTPATIENT
Start: 2024-11-18

## 2024-11-18 NOTE — PROGRESS NOTES
"Travis is a 34 year old who is being evaluated via a billable video visit.    How would you like to obtain your AVS? MyChart  If the video visit is dropped, the invitation should be resent by: Text to cell phone: [phone]  Will anyone else be joining your video visit? No      Assessment & Plan     Elevated LFTs  Recommended recheck. Discussed potential cause of fatty liver infiltrates. If continued elevations though next step could check other lab testing as well, discussed.  - Hepatic panel (Albumin, ALT, AST, Bili, Alk Phos, TP); Future  - TSH with free T4 reflex; Future    NAFLD (nonalcoholic fatty liver disease)  Reviewed previous labwork and ultrasound. Discussed lifestyle changes. He is interested in nutritionist consult. Patient is not interested in weight management medications. Handout given.  - Adult Nutrition  Referral; Future      Adjustment disorder with anxious mood  Patient had been on lexapro with good results for several years. He would like to restart, life stressors with work as a , finishing school, and internship. They have a toddler and baby due soon.  - escitalopram (LEXAPRO) 10 MG tablet; Take 1 tablet (10 mg) by mouth daily.    Encounter for screening examination for impaired glucose regulation and diabetes mellitus  - Basic metabolic panel  (Ca, Cl, CO2, Creat, Gluc, K, Na, BUN); Future  - Hemoglobin A1c; Future  - Insulin level; Future    Screening for lipoid disorders  - Lipid panel reflex to direct LDL Fasting; Future    Recommended patient follow up for lab visit and schedule preventive visit.      BMI  Estimated body mass index is 31.87 kg/m  as calculated from the following:    Height as of 6/3/24: 1.905 m (6' 3\").    Weight as of 6/3/24: 115.7 kg (255 lb).   Weight management plan: Discussed healthy diet and exercise guidelines    The longitudinal plan of care for the diagnosis(es)/condition(s) as documented were addressed during this visit. Due to the added " "complexity in care, I will continue to support Travis in the subsequent management and with ongoing continuity of care.    Subjective   Travis is a 34 year old, presenting for the following health issues:  Abdominal Pain and Referral (For a dietician)      11/18/2024     1:17 PM   Additional Questions   Roomed by ORQUIDEA Tyson     -He had some labs done a couple years ago and one of them was his liver function. It was a little elevated and he is wanting to discuss this.    -He was having pain in his upper right abdomen but he started intermittent fasting and he says that pain did seem to subside. He does this about 4 days a week    -He is wanting to discuss getting a referral for a dietician.     +++++++++++++++++++++++++++++++++++++++++++++++++++++++++++++++++++++  \"I was eating horrible\" previously. He now is fasting until lunch and symptoms improved.  Working on preparing lunches for work. He does drink protein shake in the mornings. Chicken/rice/veggies for lunch. Does snack \"all the time\", often when bored.  No alcohol use         Other than noted above, general, HEENT, respiratory, cardiac, MS, and gastrointestinal systems are negative.       Objective           Vitals:  No vitals were obtained today due to virtual visit.    Physical Exam   GENERAL: alert and no distress  EYES: Eyes grossly normal to inspection.  No discharge or erythema, or obvious scleral/conjunctival abnormalities.  RESP: No audible wheeze, cough, or visible cyanosis.    SKIN: Visible skin clear. No significant rash, abnormal pigmentation or lesions.  NEURO: Cranial nerves grossly intact.  Mentation and speech appropriate for age.  PSYCH: Appropriate affect, tone, and pace of words      Video-Visit Details    Type of service:  Video Visit   Originating Location (pt. Location): Home    Distant Location (provider location):  On-site  Platform used for Video Visit: Divine  Signed Electronically by: Rosa Chiu PA-C    "

## 2024-11-20 ENCOUNTER — LAB (OUTPATIENT)
Dept: LAB | Facility: CLINIC | Age: 34
End: 2024-11-20
Payer: COMMERCIAL

## 2024-11-20 DIAGNOSIS — R79.89 ELEVATED LFTS: ICD-10-CM

## 2024-11-20 DIAGNOSIS — Z13.220 SCREENING FOR LIPOID DISORDERS: ICD-10-CM

## 2024-11-20 DIAGNOSIS — Z11.59 NEED FOR HEPATITIS C SCREENING TEST: Primary | ICD-10-CM

## 2024-11-20 DIAGNOSIS — Z13.1 ENCOUNTER FOR SCREENING EXAMINATION FOR IMPAIRED GLUCOSE REGULATION AND DIABETES MELLITUS: ICD-10-CM

## 2024-11-20 LAB
ALBUMIN SERPL BCG-MCNC: 4.5 G/DL (ref 3.5–5.2)
ALP SERPL-CCNC: 83 U/L (ref 40–150)
ALT SERPL W P-5'-P-CCNC: 44 U/L (ref 0–70)
ANION GAP SERPL CALCULATED.3IONS-SCNC: 11 MMOL/L (ref 7–15)
AST SERPL W P-5'-P-CCNC: 31 U/L (ref 0–45)
BILIRUB DIRECT SERPL-MCNC: <0.2 MG/DL (ref 0–0.3)
BILIRUB SERPL-MCNC: 0.6 MG/DL
BUN SERPL-MCNC: 15.7 MG/DL (ref 6–20)
CALCIUM SERPL-MCNC: 9.4 MG/DL (ref 8.8–10.4)
CHLORIDE SERPL-SCNC: 101 MMOL/L (ref 98–107)
CHOLEST SERPL-MCNC: 171 MG/DL
CREAT SERPL-MCNC: 0.97 MG/DL (ref 0.67–1.17)
EGFRCR SERPLBLD CKD-EPI 2021: >90 ML/MIN/1.73M2
EST. AVERAGE GLUCOSE BLD GHB EST-MCNC: 117 MG/DL
FASTING STATUS PATIENT QL REPORTED: YES
FASTING STATUS PATIENT QL REPORTED: YES
GLUCOSE SERPL-MCNC: 95 MG/DL (ref 70–99)
HBA1C MFR BLD: 5.7 % (ref 0–5.6)
HCO3 SERPL-SCNC: 26 MMOL/L (ref 22–29)
HCV AB SERPL QL IA: NONREACTIVE
HDLC SERPL-MCNC: 33 MG/DL
INSULIN SERPL-ACNC: 24.8 UU/ML (ref 2.6–24.9)
LDLC SERPL CALC-MCNC: 100 MG/DL
NONHDLC SERPL-MCNC: 138 MG/DL
POTASSIUM SERPL-SCNC: 4.3 MMOL/L (ref 3.4–5.3)
PROT SERPL-MCNC: 7.4 G/DL (ref 6.4–8.3)
SODIUM SERPL-SCNC: 138 MMOL/L (ref 135–145)
TRIGL SERPL-MCNC: 190 MG/DL
TSH SERPL DL<=0.005 MIU/L-ACNC: 1.37 UIU/ML (ref 0.3–4.2)

## 2024-11-20 PROCEDURE — 83525 ASSAY OF INSULIN: CPT

## 2024-11-20 PROCEDURE — 86803 HEPATITIS C AB TEST: CPT

## 2024-11-20 PROCEDURE — 80053 COMPREHEN METABOLIC PANEL: CPT

## 2024-11-20 PROCEDURE — 82248 BILIRUBIN DIRECT: CPT

## 2024-11-20 PROCEDURE — 83036 HEMOGLOBIN GLYCOSYLATED A1C: CPT

## 2024-11-20 PROCEDURE — 80061 LIPID PANEL: CPT

## 2024-11-20 PROCEDURE — 84443 ASSAY THYROID STIM HORMONE: CPT

## 2024-11-20 PROCEDURE — 36415 COLL VENOUS BLD VENIPUNCTURE: CPT

## 2024-12-06 ENCOUNTER — HOSPITAL ENCOUNTER (OUTPATIENT)
Dept: NUTRITION | Facility: CLINIC | Age: 34
Discharge: HOME | End: 2024-12-06
Attending: PHYSICIAN ASSISTANT | Admitting: PHYSICIAN ASSISTANT
Payer: COMMERCIAL

## 2024-12-06 DIAGNOSIS — K76.0 NAFLD (NONALCOHOLIC FATTY LIVER DISEASE): ICD-10-CM

## 2024-12-06 PROCEDURE — 97802 MEDICAL NUTRITION INDIV IN: CPT | Mod: GT,95

## 2024-12-06 NOTE — PROGRESS NOTES
OUTPATIENT CLINICAL NUTRITION SERVICES ASSESSMENT    REASON FOR ASSESSMENT  Travis Carbone Jr. referred by Rosa Barajas PA-C  for MNT related to K76.0 (ICD-10-CM) - Fatty liver .      Patient accompanied by himself.      ASSESSMENT   Nutrition History:  - Information obtained from patient chart and patient interview.    -PMH:  Patient Active Problem List   Diagnosis    Anxiety    NAFLD (nonalcoholic fatty liver disease)    Elevated LFTs    Adjustment disorder with anxious mood       Diet Recall:  Breakfast: [employer] drink (120 mg of caffeine); sometimes skipped or BK sandwhich/couple hashbrowns or protein shakes    Lunch: 2 cups of leftovers; usually lighter   Dinner: Patient varies   Snack: Pirates booty, sometimes chips, SF popsicles, cheese sticks grazing until bed.  Beverages: 24 ounces water bottle, zevia, good and gather carbonated beverages   Dining out: Varies; more than 1-2.         Nutritional Details:   -Food allergies: NKFA  -Food sensitivities: None  -GI concerns: None  -Appetite: Patient appetite; dependent day. Eating in regards to emotions  -Pace of eating: Fast  -Role of cooking: Patient and wife  -Role of food shopping: Patient and wife    Physical Activity:  Days per week: 5-6 days  Duration: 45 minutes  Activity type: 8-10 mile running, Strength training  Limitations: Broken ankle; working on healing    NUTRITION FOCUSED PHYSICAL ASSESSMENT (NFPA) FOR DIAGNOSING MALNUTRITION  Yes  No:  Virtual visit only         Observed:   No nutrition-related physical findings observed      LABS  Labs reviewed  Lab on 11/20/2024   Component Date Value Ref Range Status    Protein Total 11/20/2024 7.4  6.4 - 8.3 g/dL Final    Albumin 11/20/2024 4.5  3.5 - 5.2 g/dL Final    Bilirubin Total 11/20/2024 0.6  <=1.2 mg/dL Final    Alkaline Phosphatase 11/20/2024 83  40 - 150 U/L Final    AST 11/20/2024 31  0 - 45 U/L Final    ALT 11/20/2024 44  0 - 70 U/L Final    Bilirubin Direct 11/20/2024 <0.20  0.00  - 0.30 mg/dL Final    TSH 11/20/2024 1.37  0.30 - 4.20 uIU/mL Final    Sodium 11/20/2024 138  135 - 145 mmol/L Final    Potassium 11/20/2024 4.3  3.4 - 5.3 mmol/L Final    Chloride 11/20/2024 101  98 - 107 mmol/L Final    Carbon Dioxide (CO2) 11/20/2024 26  22 - 29 mmol/L Final    Anion Gap 11/20/2024 11  7 - 15 mmol/L Final    Urea Nitrogen 11/20/2024 15.7  6.0 - 20.0 mg/dL Final    Creatinine 11/20/2024 0.97  0.67 - 1.17 mg/dL Final    GFR Estimate 11/20/2024 >90  >60 mL/min/1.73m2 Final    eGFR calculated using 2021 CKD-EPI equation.    Calcium 11/20/2024 9.4  8.8 - 10.4 mg/dL Final    Reference intervals for this test were updated on 7/16/2024 to reflect our healthy population more accurately. There may be differences in the flagging of prior results with similar values performed with this method. Those prior results can be interpreted in the context of the updated reference intervals.    Glucose 11/20/2024 95  70 - 99 mg/dL Final    Patient Fasting > 8hrs? 11/20/2024 Yes   Final    Cholesterol 11/20/2024 171  <200 mg/dL Final    Triglycerides 11/20/2024 190 (H)  <150 mg/dL Final    Direct Measure HDL 11/20/2024 33 (L)  >=40 mg/dL Final    LDL Cholesterol Calculated 11/20/2024 100 (H)  <100 mg/dL Final    Non HDL Cholesterol 11/20/2024 138 (H)  <130 mg/dL Final    Patient Fasting > 8hrs? 11/20/2024 Yes   Final    Estimated Average Glucose 11/20/2024 117 (H)  <117 mg/dL Final    Hemoglobin A1C 11/20/2024 5.7 (H)  0.0 - 5.6 % Final    Normal <5.7%   Prediabetes 5.7-6.4%    Diabetes 6.5% or higher     Note: Adopted from ADA consensus guidelines.    Insulin 11/20/2024 24.8  2.6 - 24.9 uU/mL Final    Hepatitis C Antibody 11/20/2024 Nonreactive  Nonreactive Final    A nonreactive screening test result does not exclude the possibility of exposure to or infection with HCV. Nonreactive screening test results in individuals with prior exposure to HCV may be due to antibody levels below the limit of detection of this assay  "or lack of reactivity to the HCV antigens used in this assay. Patients with recent HCV infections (<3 months from time of exposure) may have false-negative HCV antibody results due to the time needed for seroconversion (average of 8 to 9 weeks).       MEDICATIONS  Medications reviewed  Current Outpatient Medications   Medication Sig Dispense Refill    escitalopram (LEXAPRO) 10 MG tablet Take 1 tablet (10 mg) by mouth daily. 90 tablet 0     No current facility-administered medications for this visit.       ANTHROPOMETRICS   Height: 6'3\"  Weight: 115.7 kg  BMI (kg/m2): 31.9  Weight Status:  Obesity Grade I BMI 30-34.9  IBW: 89 kg  %IBW: 130%  Weight History:   Wt Readings from Last 15 Encounters:   06/03/24 115.7 kg (255 lb)   05/06/24 115.7 kg (255 lb)   04/08/24 115.7 kg (255 lb)   01/27/24 115.7 kg (255 lb)   01/27/23 127.6 kg (281 lb 3.2 oz)   08/08/22 124.7 kg (275 lb)   01/20/21 115.7 kg (255 lb)   10/15/19 115 kg (253 lb 8 oz)   02/26/18 112.5 kg (248 lb)   02/25/18 109.8 kg (242 lb)   12/16/17 117.2 kg (258 lb 6.4 oz)   09/07/17 115.7 kg (255 lb)   04/14/17 108.4 kg (239 lb)       Dosing weight: 95.7 kg-adj BW used.    ASSESSED NUTRITION NEEDS  Estimated Energy Needs: 1,914-2,392 kcals/day (20-25 Kcal/Kg)  Justification:  (obese)  Estimated Protein Needs: 77- grams protein/day (0.8-1-1.2+ g pro/Kg); MD notes encourage 120 grams of protein per day.  Justification:  (maintenance)  Estimated Fluid Needs: 1,914-2,392 mL/day (1 mL/Kcal)    ASSESSED MALNUTRITION STATUS  % Weight Loss:  None noted  % Intake:  No decreased intake noted  Subcutaneous Fat Loss:  None observed  Loss of Muscle Mass:  unable to assess  Fluid Retention:  None noted    Malnutrition Diagnosis:  Patient does not meet two of the above criteria necessary for diagnosing malnutrition      DIAGNOSIS   Nutrition Diagnosis:  Excessive energy intake related to inappropriate oral intakes as evidenced by BMI of 31.9 and dx of NAFLD.  " "    INTERVENTIONS   Nutrition Prescription weight management MNT, general healthy eating guidelines, weight loss tips and tricks, NAFLD MNT.      IMPLEMENTATION   Assessed learning needs and learning preference:   Teaching Method(s) used: Literature  Explanation    Nutrition Education (Content):              a)  Discussed My plate, the role of fiber, saturated vs unsaturated fat, Mediterranean diet guidelines, NAFLD MNT, weight management MNT              b)  Provided the following handouts: General Healthful Mediterranean Nutrition Therapy, MyPlate Guide, Weight Loss Tips, and Hunger- Fullness Cues    Nutrition Education (Application):              a)  Discussed current eating plans / recommended alternative food choices              b)  Patient verbalizes understanding of diet by identifying 1-2 meal according to the Myplate compostion.      Anticipate good compliance   Stage of Change:  contemplation  Additional:     GOALS  1) Patient will swap out refined grains for whole grains at meals/snacks  2) Patient will aim for 90 fluid ounces of water daily  3) Patient will utilize hunger and fullness scale throughout the day.     FOLLOW UP/MONITORING   Progress towards goals will be monitored and evaluated per protocol and Practice Guidelines    Time Spent with Patient  60 minutes      Belinda Hurst RDN, LD  Clinical Dietitian  Office: [phone]  Hours: M-F 8-3pm   Weekend/Holiday EDGAR Young - \"Weekend Clinical Dietitian\" (No longer using pager)    "

## 2025-01-11 ENCOUNTER — APPOINTMENT (OUTPATIENT)
Dept: RADIOLOGY | Facility: HOSPITAL | Age: 35
End: 2025-01-11
Attending: STUDENT IN AN ORGANIZED HEALTH CARE EDUCATION/TRAINING PROGRAM

## 2025-01-11 ENCOUNTER — APPOINTMENT (OUTPATIENT)
Dept: CT IMAGING | Facility: HOSPITAL | Age: 35
End: 2025-01-11

## 2025-01-11 ENCOUNTER — HOSPITAL ENCOUNTER (EMERGENCY)
Facility: HOSPITAL | Age: 35
Discharge: HOME | End: 2025-01-11
Attending: STUDENT IN AN ORGANIZED HEALTH CARE EDUCATION/TRAINING PROGRAM | Admitting: STUDENT IN AN ORGANIZED HEALTH CARE EDUCATION/TRAINING PROGRAM
Payer: OTHER MISCELLANEOUS

## 2025-01-11 VITALS
DIASTOLIC BLOOD PRESSURE: 88 MMHG | RESPIRATION RATE: 18 BRPM | HEART RATE: 66 BPM | OXYGEN SATURATION: 98 % | WEIGHT: 303.7 LBS | SYSTOLIC BLOOD PRESSURE: 139 MMHG | BODY MASS INDEX: 37.76 KG/M2 | HEIGHT: 75 IN | TEMPERATURE: 98.3 F
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
## 2025-01-11 DIAGNOSIS — M54.2 NECK PAIN ON RIGHT SIDE: ICD-10-CM

## 2025-01-11 PROCEDURE — 250N000013 HC RX MED GY IP 250 OP 250 PS 637: Performed by: STUDENT IN AN ORGANIZED HEALTH CARE EDUCATION/TRAINING PROGRAM

## 2025-01-11 PROCEDURE — 99284 EMERGENCY DEPT VISIT MOD MDM: CPT | Mod: 25 | Performed by: STUDENT IN AN ORGANIZED HEALTH CARE EDUCATION/TRAINING PROGRAM

## 2025-01-11 PROCEDURE — 70450 CT HEAD/BRAIN W/O DYE: CPT

## 2025-01-11 PROCEDURE — 71046 X-RAY EXAM CHEST 2 VIEWS: CPT

## 2025-01-11 PROCEDURE — 72125 CT NECK SPINE W/O DYE: CPT

## 2025-01-11 RX ORDER — ACETAMINOPHEN 325 MG/1
975 TABLET ORAL ONCE
Status: COMPLETED | OUTPATIENT
Start: 2025-01-11 | End: 2025-01-11

## 2025-01-11 RX ADMIN — ACETAMINOPHEN 975 MG: 325 TABLET ORAL at 12:40

## 2025-01-11 ASSESSMENT — ACTIVITIES OF DAILY LIVING (ADL)
ADLS_ACUITY_SCORE: 41
ADLS_ACUITY_SCORE: 41
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
## 2025-01-14 ENCOUNTER — TELEPHONE (OUTPATIENT)
Dept: PHYSICAL MEDICINE AND REHAB | Facility: CLINIC | Age: 35
End: 2025-01-14
Payer: COMMERCIAL

## 2025-01-14 NOTE — TELEPHONE ENCOUNTER
Left Voicemail (1st Attempt) and Sent Mychart (1st Attempt) for the patient to call back and schedule the following:    Appointment type: New Concussion  Provider: Dr. Jaramillo  Return date: next avail  Specialty phone number: [phone]  Additional appointment(s) needed: NA  Additonal Notes:

## 2025-01-22 ENCOUNTER — OFFICE VISIT (OUTPATIENT)
Dept: FAMILY MEDICINE | Facility: CLINIC | Age: 35
End: 2025-01-22

## 2025-01-22 VITALS
RESPIRATION RATE: 16 BRPM | HEART RATE: 74 BPM | BODY MASS INDEX: 34.69 KG/M2 | WEIGHT: 279 LBS | HEIGHT: 75 IN | DIASTOLIC BLOOD PRESSURE: 80 MMHG | SYSTOLIC BLOOD PRESSURE: 120 MMHG | TEMPERATURE: 98.3 F | OXYGEN SATURATION: 98 %
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
## 2025-01-22 PROBLEM — F41.9 ANXIETY: Status: RESOLVED | Noted: 2018-03-06 | Resolved: 2025-01-22

## 2025-01-22 PROCEDURE — 99212 OFFICE O/P EST SF 10 MIN: CPT | Performed by: PHYSICIAN ASSISTANT

## 2025-01-22 RX ORDER — MULTIVITAMIN
1 TABLET ORAL DAILY
COMMUNITY

## 2025-01-22 ASSESSMENT — PAIN SCALES - GENERAL: PAINLEVEL_OUTOF10: MILD PAIN (2)
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
## 2025-01-22 NOTE — LETTER
January 22, 2025      Travis Carbone Jr.  [address] [address]        To Whom It May Concern:    Travis Carbone Jr. was seen in our clinic. He may return to work with the following: no restrictions on 1/22/25.      Sincerely,      Rosa Chiu        Electronically signed

## 2025-02-19 ENCOUNTER — TELEPHONE (OUTPATIENT)
Dept: PHYSICAL MEDICINE AND REHAB | Facility: CLINIC | Age: 35
End: 2025-02-19
Payer: COMMERCIAL

## 2025-02-19 NOTE — TELEPHONE ENCOUNTER
Left Voicemail (2nd Attempt), MyC cancellation sent for the patient to call back and schedule the following:    Appointment type: New Concussion  Provider: Margarita Jaramillo  Return date: next available  Specialty phone number: [phone]  Additional appointment(s) needed: NA  Additonal Notes: reschedule 2/20/25 appt as provider had a change in schedule.    Isabel Leon on 2/19/2025 at 5:11 PM

## 2025-02-19 NOTE — TELEPHONE ENCOUNTER
Left Voicemail (1st Attempt) and Sent Mychart (1st Attempt) for the patient to call back and schedule the following:    Appointment type: New Concussion-Virtual  Provider: Margarita Gupta  Return date: next available  Specialty phone number: [phone]  Additional appointment(s) needed: NA  Additonal Notes: reschedule 2/20/25 appt as provider had a change in schedule.    Isabel Leon on 2/19/2025 at 10:34 AM

## 2025-06-20 ENCOUNTER — RESULTS FOLLOW-UP (OUTPATIENT)
Dept: FAMILY MEDICINE | Facility: CLINIC | Age: 35
End: 2025-06-20

## 2025-07-22 ENCOUNTER — E-VISIT (OUTPATIENT)
Dept: URGENT CARE | Facility: CLINIC | Age: 35
End: 2025-07-22
Payer: COMMERCIAL

## 2025-07-22 DIAGNOSIS — H10.32 ACUTE BACTERIAL CONJUNCTIVITIS OF LEFT EYE: Primary | ICD-10-CM

## 2025-07-22 RX ORDER — OFLOXACIN 3 MG/ML
SOLUTION/ DROPS OPHTHALMIC
Qty: 5 ML | Refills: 0 | Status: SHIPPED | OUTPATIENT
Start: 2025-07-22 | End: 2025-07-29

## 2025-07-22 NOTE — PATIENT INSTRUCTIONS
Thank you for choosing us for your care. I have placed an order for a prescription so that you can start treatment. View your full visit summary for details by clicking on the link below. Your pharmacist will able to address any questions you may have about the medication.     If you re not feeling better within 2-3 days, please schedule an appointment.  You can schedule an appointment right here in [facility], or call [phone]  If the visit is for the same symptoms as your eVisit, we ll refund the cost of your eVisit if seen within seven days.    Pinkeye: Care Instructions  Overview     Pinkeye is redness and swelling of the eye surface and the conjunctiva (the lining of the eyelid and the covering of the white part of the eye). Pinkeye is also called conjunctivitis. Pinkeye is often caused by infection with bacteria or a virus. Dry air, allergies, smoke, and chemicals are other common causes.  Pinkeye often gets better on its own in 7 to 10 days. Antibiotics only help if the pinkeye is caused by bacteria. Pinkeye caused by infection spreads easily. If an allergy or chemical is causing pinkeye, it will not go away unless you can avoid whatever is causing it.  Follow-up care is a key part of your treatment and safety. Be sure to make and go to all appointments, and call your doctor if you are having problems. It's also a good idea to know your test results and keep a list of the medicines you take.  How can you care for yourself at home?  Wash your hands often. Always wash them before and after you treat pinkeye or touch your eyes or face.  Use moist cotton or a clean, wet cloth to remove crust. Wipe from the inside corner of the eye to the outside. Use a clean part of the cloth for each wipe.  Put cold or warm wet cloths on your eye a few times a day if the eye hurts.  Do not wear contact lenses or eye makeup until the pinkeye is gone. Throw away any eye makeup you were using when you got pinkeye. Clean your  "contacts and storage case. If you wear disposable contacts, use a new pair when your eye has cleared and it is safe to wear contacts again.  If the doctor gave you antibiotic ointment or eyedrops, use them as directed. Use the medicine for as long as instructed, even if your eye starts looking better soon. Keep the bottle tip clean, and do not let it touch the eye area.  To put in eyedrops or ointment:  Tilt your head back, and pull your lower eyelid down with one finger.  Drop or squirt the medicine inside the lower lid.  Close your eye for 30 to 60 seconds to let the drops or ointment move around.  Do not touch the ointment or dropper tip to your eyelashes or any other surface.  Do not share towels, pillows, or washcloths while you have pinkeye.  When should you call for help?   Call your doctor now or seek immediate medical care if:    You have pain in your eye, not just irritation on the surface.     You have a change in vision or loss of vision.     You have an increase in discharge from the eye.     Your eye has not started to improve or begins to get worse within 48 hours after you start using antibiotics.     Pinkeye lasts longer than 7 days.   Watch closely for changes in your health, and be sure to contact your doctor if you have any problems.  Where can you learn more?  Go to https://www.[employer].net/patiented  Enter Y392 in the search box to learn more about \"Pinkeye: Care Instructions.\"  Current as of: July 31, 2024  Content Version: 14.5 2024-2025 [employer].   Care instructions adapted under license by your healthcare professional. If you have questions about a medical condition or this instruction, always ask your healthcare professional. [employer] disclaims any warranty or liability for your use of this information.    "